# Patient Record
Sex: FEMALE | Race: WHITE | NOT HISPANIC OR LATINO | Employment: OTHER | URBAN - NONMETROPOLITAN AREA
[De-identification: names, ages, dates, MRNs, and addresses within clinical notes are randomized per-mention and may not be internally consistent; named-entity substitution may affect disease eponyms.]

---

## 2017-01-05 ENCOUNTER — HOSPITAL ENCOUNTER (OUTPATIENT)
Dept: GASTROENTEROLOGY | Facility: HOSPITAL | Age: 62
Setting detail: HOSPITAL OUTPATIENT SURGERY
Discharge: HOME | End: 2017-01-05
Attending: INTERNAL MEDICINE | Admitting: INTERNAL MEDICINE

## 2017-01-10 ENCOUNTER — OFFICE VISIT (OUTPATIENT)
Dept: GASTROENTEROLOGY | Facility: CLINIC | Age: 62
End: 2017-01-10

## 2017-01-10 VITALS
HEART RATE: 66 BPM | WEIGHT: 174.1 LBS | BODY MASS INDEX: 27.33 KG/M2 | DIASTOLIC BLOOD PRESSURE: 77 MMHG | SYSTOLIC BLOOD PRESSURE: 143 MMHG | HEIGHT: 67 IN

## 2017-01-10 DIAGNOSIS — K62.5 RECTAL BLEEDING: Primary | ICD-10-CM

## 2017-01-10 DIAGNOSIS — K59.00 CONSTIPATION, UNSPECIFIED CONSTIPATION TYPE: ICD-10-CM

## 2017-01-10 PROCEDURE — 99213 OFFICE O/P EST LOW 20 MIN: CPT | Performed by: NURSE PRACTITIONER

## 2017-01-10 RX ORDER — CETIRIZINE HYDROCHLORIDE 10 MG/1
10 TABLET ORAL DAILY
COMMUNITY

## 2017-01-10 RX ORDER — HYDROCORTISONE ACETATE 25 MG/1
25 SUPPOSITORY RECTAL 2 TIMES DAILY PRN
Qty: 30 SUPPOSITORY | Refills: 0 | Status: SHIPPED | OUTPATIENT
Start: 2017-01-10 | End: 2017-12-04 | Stop reason: SDUPTHER

## 2017-01-10 RX ORDER — FLUTICASONE PROPIONATE 50 MCG
1 SPRAY, SUSPENSION (ML) NASAL DAILY
COMMUNITY
Start: 2015-10-19

## 2017-01-10 RX ORDER — DILTIAZEM HYDROCHLORIDE 180 MG/1
CAPSULE, COATED, EXTENDED RELEASE ORAL
COMMUNITY
Start: 2016-07-15 | End: 2017-01-30

## 2017-01-10 NOTE — PROGRESS NOTES
Chief Complaint   Patient presents with   • Colonoscopy     follow-up       Subjective    Tarah Hawkins is a 61 y.o. female. she is here today as a follow-up.    History of Present Illness  61-year-old female presents to discuss colonoscopy results.  She denies any abdominal pain.  States she has become more constipated recently.  In 2008 patient had a rectal villotubular polyp requiring resection per Dr. Weir.  It was noted to be  villous with no malignancy.  States she is having continual rectal bleeding and rectal discharge that appears mucousy.  She has tried Anusol HC rectal suppositories without relief of symptoms.  States it did help somewhat.  Colonoscopy was completed 1/5/17 noted nonbleeding hemorrhoids.  Hemorrhoids were grade 2. Multiple medium mouth diverticula found sigmoid colon.  There was evidence of prior end to end: Colonic enema anastomosis in the rectosigmoid colon this was patent and characterized by healthy-appearing mucosa anastomosis was traversed.  Exam was otherwise without abnormality and no biopsies were collected.  Plan; recommended Colace daily, Benefiber 1 tablespoon per day for diverticular disease.  Repeat colonoscopy in 5 years for surveillance.  The following portions of the patient's history were reviewed and updated as appropriate:   Past Medical History   Diagnosis Date   • Acute bronchiolitis    • Astigmatism    • Diarrhea    • Diverticular disease of colon    • Ectopic atrial tachycardia    • Ganglion cyst of left foot      DORSAL   • Hemorrhoid    • History of colon polyps    • History of seborrheic keratosis      ON NECK   • History of urinary system disease    • Hyperlipidemia    • Hypertension    • Menopausal syndrome    • Need for vaccination    • Presbyopia    • Pseudomembranous enterocolitis      IMPROVED   • Rapid palpitations    • Shoulder pain    • Skin tag      HISTORY OF   • Supraventricular tachycardia    • Verruca vulgaris      Past Surgical History    Procedure Laterality Date   • Colonoscopy  03/18/2013     Moderately severe diverticulosis found in the sigmoid colon, descending colon, and ascending colon. Stool was aspirated for study. Hemorrhoids found.   • Excision lesion Right 08/26/2002     2 mm punch biopsy, cheek.   • Injection of medication  11/11/2013     KENALOG, X2   • Rectal surgery  10/06/2008     Rectal polyp at 4 cm from the anus posteriorly. Transanal polypectomy.   • Chest wall biopsy  04/08/2002     Chest lesion removal. Shave biopsy, central chest.   • Foot surgery Left 08/22/2000     Removal of ganglion cyst of the dorsal foot.     Family History   Problem Relation Age of Onset   • Hypertension Other    • Stroke Other    • Other Other      COLON PROBLEMS     OB History     No data available        Current Outpatient Prescriptions   Medication Sig Dispense Refill   • cetirizine (zyrTEC) 10 MG tablet Take 10 mg by mouth.     • diltiazem CD (CARDIZEM CD) 180 MG 24 hr capsule Take 180 mg by mouth Daily.     • diltiaZEM CD (CARTIA XT) 180 MG 24 hr capsule      • estradiol (MINIVELLE, VIVELLE-DOT) 0.1 MG/24HR patch Place 1 patch on the skin 2 (Two) Times a Week.     • FIBER PO Take  by mouth. FIBER     • fluticasone (FLONASE) 50 MCG/ACT nasal spray 50 g.     • Multiple Vitamins-Minerals (MULTIVITAMIN PO) Take 1 tablet by mouth Daily.     • pravastatin (PRAVACHOL) 80 MG tablet Take 80 mg by mouth Daily.     • hydrocortisone (ANUSOL-HC) 25 MG suppository Insert 1 suppository into the rectum 2 (Two) Times a Day As Needed for hemorrhoids (rectal discomfort). 30 suppository 0     No current facility-administered medications for this visit.      No Known Allergies  Social History     Social History   • Marital status:      Spouse name: N/A   • Number of children: N/A   • Years of education: N/A     Social History Main Topics   • Smoking status: Never Smoker   • Smokeless tobacco: Never Used   • Alcohol use No   • Drug use: No   • Sexual activity:  "Defer     Other Topics Concern   • Not on file     Social History Narrative       Review of Systems  Review of Systems   HENT: Negative for trouble swallowing.    Gastrointestinal: Positive for anal bleeding (intermittently). Negative for abdominal distention, blood in stool, constipation, diarrhea and rectal pain.        Visit Vitals   • /77   • Pulse 66   • Ht 67\" (170.2 cm)   • Wt 174 lb 1.6 oz (79 kg)   • BMI 27.27 kg/m2       Objective    Physical Exam   Constitutional: She appears well-developed and well-nourished. No distress.   Neck: No thyroid mass and no thyromegaly present.   Cardiovascular: Normal rate, regular rhythm and normal heart sounds.    Pulmonary/Chest: Effort normal and breath sounds normal. No respiratory distress.   Abdominal: Soft. Normal appearance and bowel sounds are normal. She exhibits no distension and no mass. There is no hepatosplenomegaly. There is no tenderness. No hernia.     Hospital Outpatient Visit on 11/14/2016   Component Date Value Ref Range Status   • Free T4 11/14/2016 1.12  0.78 - 2.19 ng/dl Final   • TSH 11/14/2016 0.15* 0.46 - 4.68 uIU/ml Final     Assessment/Plan      1. Rectal bleeding    2. Constipation, unspecified constipation type    .       Review and/or summary of lab tests, radiology, procedures, medications. Review and summary of old records and obtaining of history. The risks and benefits of my recommendations, as well as other treatment options were discussed with the patient today. Questions were answered.    New Medications Ordered This Visit   Medications   • hydrocortisone (ANUSOL-HC) 25 MG suppository     Sig: Insert 1 suppository into the rectum 2 (Two) Times a Day As Needed for hemorrhoids (rectal discomfort).     Dispense:  30 suppository     Refill:  0       Follow-up: As needed.         Results for orders placed or performed during the hospital encounter of 11/14/16   TSH   Result Value Ref Range    TSH 0.15 (L) 0.46 - 4.68 uIU/ml   T4, Free "   Result Value Ref Range    Free T4 1.12 0.78 - 2.19 ng/dl   Results for orders placed or performed during the hospital encounter of 11/14/16   CBC & Differential   Result Value Ref Range    WBC 5.5 3.2 - 9.8 x1000/uL    RBC 4.40 3.77 - 5.16 bill/mm3    Hemoglobin 13.4 12.0 - 15.5 gm/dl    Hematocrit 41.0 35.0 - 45.0 %    MCV 93.2 80.0 - 98.0 fl    MCH 30.5 26.0 - 34.0 pg    MCHC 32.7 31.4 - 36.0 gm/dl    Platelets 307 150 - 450 x1000/mm3    RDW 13.6 11.5 - 14.5 %    MPV 9.6 8.0 - 12.0 fl    Neutrophil Rel % 66.5 37.0 - 80.0 %    Lymphocyte Rel % 20.5 10.0 - 50.0 %    Monocyte Rel % 9.3 0.0 - 12.0 %    Eosinophil Rel % 3.3 0.0 - 7.0 %    Basophil Rel % 0.4 0.0 - 2.0 %    Neutrophils Absolute 3.66 2.00 - 8.60 x1000/uL    Lymphocytes Absolute 1.13 0.60 - 4.20 x1000/uL    Monocytes Absolute 0.51 0.00 - 0.90 x1000/uL    Eosinophils Absolute 0.18 0.00 - 0.70 x1000/uL    Basophils Absolute 0.02 0.00 - 0.20 x1000/uL    nRBC 0     nRBC 0    Lipid Panel   Result Value Ref Range    Total Cholesterol 172 150 - 200 mg/dl    Triglycerides 76 35 - 160 mg/dl    HDL Cholesterol 65.2 35.0 - 100.0 mg/dl    LDL Cholesterol  92 mg/dl   Comprehensive Metabolic Panel   Result Value Ref Range    Glucose 97 70.0 - 100.0 mg/dl    BUN 11 8.0 - 25.0 mg/dl    Creatinine 0.7 0.4 - 1.3 mg/dl    Sodium 139.0 134 - 146 mmol/L    Potassium 4.1 3.4 - 5.4 mmol/L    Chloride 105.0 100.0 - 112.0 mmol/L    CO2 28.0 20.0 - 32.0 mmol/L    Calcium 9.1 8.4 - 10.8 mg/dl    Total Protein 7.2 6.7 - 8.2 gm/dl    Albumin 4.2 3.2 - 5.5 gm/dl    Total Bilirubin 0.8 0.2 - 1.0 mg/dl    Alkaline Phosphatase 66 15 - 121 U/L    ALT (SGPT) 25 10 - 60 U/L    AST (SGOT) 30 10 - 60 U/L    GFR MDRD Non  85 45 - 104 mL/min/1.73 sq.M    GFR MDRD  103 45 - 104 mL/min/1.73 sq.M    Anion Gap 6.0 5.0 - 15.0 mmol/L   Results for orders placed or performed during the hospital encounter of 10/18/16   Urinalysis, Microscopic Only   Result Value Ref  "Range    WBC, UA TNTC (H) NONE SEEN,0-2,3-5  /HPF    RBC, UA 3-5 NONE SEEN,0-2,3-5  /HPF    Epithelial cells 0-2 NONE SEEN,0-2,3-5  /HPF    Bacteria, UA 4+ (H) NONE SEEN   Urinalysis   Result Value Ref Range    Color, UA YELLOW STRAW,YELLOW,DK YELLOW,NAVID    Appearance CLOUDY (H) CLEAR    Specific Gravity, UA 1.020 1.003 - 1.030    pH, UA 6.5 5.0 - 9.0 pH Units    Leukocytes, UA 3+ (H) NEGATIVE    Nitrite, UA POSITIVE (H) NEGATIVE    Protein, UA TRACE (H) NEGATIVE    Glucose, Urine NEGATIVE NEGATIVE mg/dl    Ketones, UA NEGATIVE NEGATIVE    Urobilinogen, UA 0.2 0.2 EU/dl    Blood, UA 1+ (H) NEGATIVE   Results for orders placed or performed in visit on 10/06/16    COLONOSCOPY   Result Value Ref Range     Colonoscopy ORDERED BY DR SHIKHA FLOR    Results for orders placed or performed in visit on 08/11/14   TSH   Result Value Ref Range    TSH 2.11 0.46 - 4.68 uIU/ml   Results for orders placed or performed in visit on 03/18/13   Converted Surgical Pathology   Result Value Ref Range    Spec Descr 1 SPECIMEN(S): A RIGHT COLON BIOPSY     Specimen description 2 SPECIMEN(S): B LEFT COLON BIOPSY     Preoperative Diagnosis   PREOPERATIVE DIAGNOSIS:  None Given       Postoperative Diagnosis   POSTOPERATIVE DIAGNOSIS:  Diarrhea       Gross Description         GROSS DESCRIPTION:  Specimen A is labeled \"#1 right colon\" and consists of 2 tan fragments  measuring 0.6 x 0.4 x 0.2 cm together.  Totally submitted.  Specimen B is labeled \"#2 left colon\" and consists of 3 tan fragments  measuring 1.0 x 0.3 x 0.2 cm together.  Totally submitted.      Final Diagnosis         FINAL DIAGNOSIS:  A.  RIGHT COLON, BIOPSY:            NO SIGNIFICANT HISTOLOGIC ABNORMALITY.  B.  LEFT COLON, BIOPSY:            NO SIGNIFICANT HISTOLOGIC ABNORMALITY.      CONVERTED (HISTORICAL) FINAL PATHOLOGIST       Diagnostician:  MELIDA LEVY M.D.  Pathologist  Electronically Signed 03/19/2013      Diagnosis Code   DIAGNOSIS CODE:  1      Results " "for orders placed or performed in visit on 10/06/08   Converted Surgical Pathology   Result Value Ref Range    Spec Descr 1 SPECIMEN(S): A POLYP, RECTAL     Clinical Information   CLINICAL HISTORY:  None Given       Clinical Diagnosis   CLINICAL DIAGNOSIS:  Rectal polyp       Gross Description         GROSS DESCRIPTION:  The specimen consists of a polypoid structure from the rectum measuring  4.0 cm in greatest dimension.  Bisected and all embedded.      Frozen Diagnosis   FROZEN SECTION DIAGNOSIS:  Villous adenoma.       Final Diagnosis         FINAL DIAGNOSIS:  RECTAL POLYP:       VILLOUS ADENOMA.       NO EVIDENCE OF MALIGNANCY.    DIAGNOSIS CODE:  8      CONVERTED (HISTORICAL) FINAL PATHOLOGIST       Diagnostician:  DENISHA SALTER M.D.  Pathologist  Electronically Signed 10/09/2008     Results for orders placed or performed in visit on 09/11/08   Converted Surgical Pathology   Result Value Ref Range    Spec Descr 1 SPECIMEN(S): A POLYP, RECTAL     Clinical Information   CLINICAL HISTORY:  None Given       Gross Description         GROSS DESCRIPTION:  The container is labeled \"polyp, rectum\" and has multiple nodular  fragments of white soft tissue measuring 1.0 cc in aggregate.  The  entire specimen is embedded.      Final Diagnosis         FINAL DIAGNOSIS:  POLYP, RECTUM:       VILLOTUBULAR ADENOMA.    DIAGNOSIS CODE:  8      Comment   CLINICAL DIAGNOSIS:  Rectal polyp       CONVERTED (HISTORICAL) FINAL PATHOLOGIST       Diagnostician:  DARSHANA CM M.D.  Pathologist  Electronically Signed 09/15/2008       "

## 2017-01-10 NOTE — MR AVS SNAPSHOT
Tarah Hawkins   1/10/2017 11:00 AM   Office Visit    Dept Phone:  [phone]   Encounter #:  [id number]    Provider:  ELY Stein   Department:  [facility] GASTROENTEROLOGY                Your Full Care Plan              Today's Medication Changes          These changes are accurate as of: 1/10/17 11:17 AM.  If you have any questions, ask your nurse or doctor.               New Medication(s)Ordered:     hydrocortisone 25 MG suppository   Commonly known as:  ANUSOL-HC   Insert 1 suppository into the rectum 2 (Two) Times a Day As Needed for hemorrhoids (rectal discomfort).         Stop taking medication(s)listed here:     sodium-potassium-magnesium sulfates solution oral solution   Commonly known as:  SUPREP BOWEL PREP                Where to Get Your Medications      These medications were sent to [facility] [address] [place] [address] AT [facility] [address] - [phone] [facility] [phone] [address] [address]     Phone:  [phone]     hydrocortisone 25 MG suppository                  Your Updated Medication List          This list is accurate as of: 1/10/17 11:17 AM.  Always use your most recent med list.                cetirizine 10 MG tablet   Commonly known as:  zyrTEC       * diltiaZEM  MG 24 hr capsule   Commonly known as:  CARDIZEM CD       * CARTIA  MG 24 hr capsule   Generic drug:  diltiaZEM CD       estradiol 0.1 MG/24HR patch   Commonly known as:  MINIDEJA, LUISELLE-DOT       FIBER PO       fluticasone 50 MCG/ACT nasal spray   Commonly known as:  FLONASE       hydrocortisone 25 MG suppository   Commonly known as:  ANUSOL-HC   Insert 1 suppository into the rectum 2 (Two) Times a Day As Needed for hemorrhoids (rectal discomfort).       MULTIVITAMIN PO       pravastatin 80 MG tablet   Commonly known as:  PRAVACHOL       * Notice:  This list has 2 medication(s) that are the same as other  "medications prescribed for you. Read the directions carefully, and ask your doctor or other care provider to review them with you.            You Were Diagnosed With        Codes Comments    Rectal bleeding    -  Primary ICD-10-CM: K62.5  ICD-9-CM: 569.3     Constipation, unspecified constipation type     ICD-10-CM: K59.00  ICD-9-CM: 564.00       Instructions     None    Patient Instructions History      Upcoming Appointments     Visit Type Date Time Department    OFFICE VISIT 1/10/2017 11:00 AM [facility] [place]  [facility]    OFFICE VISIT 1/30/2017 11:00 AM [facility] [place] [facility]      MyChart Signup     Our records indicate that you have declined [facility] HipLogiqt signup. If you would like to sign up for [employer], please email [email or web address] or call [phone] to obtain an activation code.             Other Info from Your Visit           Your Appointments     Jan 30, 2017 11:00 AM [facility]   Office Visit with Filippo Rousseau MD PhD   [facility] CARDIOLOGY (--)    [address] [place] [address] Dr  [facility] [address] [place] [address] [phone]           Arrive 15 minutes prior to appointment.              Allergies     No Known Allergies      Reason for Visit     Colonoscopy follow-up      Vital Signs     Blood Pressure Pulse Height Weight Body Mass Index Smoking Status    143/77 66 67\" (170.2 cm) 174 lb 1.6 oz (79 kg) 27.27 kg/m2 Never Smoker      Problems and Diagnoses Noted     Rectal bleeding    -  Primary    Constipation            "

## 2017-01-23 RX ORDER — PRAVASTATIN SODIUM 80 MG/1
TABLET ORAL
Qty: 90 TABLET | Refills: 2 | Status: SHIPPED | OUTPATIENT
Start: 2017-01-23 | End: 2017-10-20 | Stop reason: SDUPTHER

## 2017-01-30 ENCOUNTER — OFFICE VISIT (OUTPATIENT)
Dept: CARDIOLOGY | Facility: CLINIC | Age: 62
End: 2017-01-30

## 2017-01-30 VITALS
HEART RATE: 72 BPM | WEIGHT: 175.8 LBS | SYSTOLIC BLOOD PRESSURE: 120 MMHG | HEIGHT: 67 IN | DIASTOLIC BLOOD PRESSURE: 80 MMHG | BODY MASS INDEX: 27.59 KG/M2 | OXYGEN SATURATION: 97 %

## 2017-01-30 DIAGNOSIS — I47.1 ECTOPIC ATRIAL TACHYCARDIA (HCC): Primary | ICD-10-CM

## 2017-01-30 DIAGNOSIS — I34.0 NON-RHEUMATIC MITRAL REGURGITATION: ICD-10-CM

## 2017-01-30 DIAGNOSIS — I10 ESSENTIAL HYPERTENSION: ICD-10-CM

## 2017-01-30 DIAGNOSIS — E78.2 MIXED HYPERLIPIDEMIA: ICD-10-CM

## 2017-01-30 PROCEDURE — 93000 ELECTROCARDIOGRAM COMPLETE: CPT | Performed by: INTERNAL MEDICINE

## 2017-01-30 PROCEDURE — 99214 OFFICE O/P EST MOD 30 MIN: CPT | Performed by: INTERNAL MEDICINE

## 2017-01-30 NOTE — LETTER
January 30, 2017     Joe Jones MD  [address] [place] [address] [address] [place] [address]    Patient: Tarah Hawkins   YOB: 1955   Date of Visit: 1/30/2017       Dear Dr. Karen MD:    Thank you for referring Tarah Hawkins to me for evaluation. Below are the relevant portions of my assessment and plan of care.    If you have questions, please do not hesitate to call me. I look forward to following Tarah along with you.         Sincerely,        Filippo Rousseau MD PhD        CC: No Recipients  Filippo Rousseau MD PhD  1/30/2017 11:14 AM  Sign at close encounter  Cardiovascular Medicine   Filippo Rousseau M.D., Ph.D., [facility]      The patient returns to cardiology clinic for follow-up of the following cardiac problems:    PROBLEM LIST:      1. HTN  A. Preserved EF  B. Mild LVH  2. EAT  A. Dilt  3. HLD    SVT  Patient returns for her EAT.   Very rare episodes of increased cardiac awareness.   No dizziness or syncope.  No emergency department or primary care visits for this issue.  She remains on diltiazem.  Denies side effects.      HTN  Concerning her hypertension, her medication is diltiazem.  She is medication and diet compliant.  Does follow up with her PCP.    HLD  Concerning her hyperlipidemia, she remains on statin.  Denies side effects.  She states she has this monitored by her PCP.        Review of Systems - History obtained from the patient  Cardiovascular ROS: no chest pain or dyspnea on exertion  family history includes Hypertension in her other; Other in her other; Stroke in her other.   reports that she has never smoked. She has never used smokeless tobacco. She reports that she does not drink alcohol or use illicit drugs.  No Known Allergies    Current Outpatient Prescriptions:   •  cetirizine (zyrTEC) 10 MG tablet, Take 10 mg by mouth., Disp: , Rfl:   •  diltiazem CD (CARDIZEM CD) 180 MG 24 hr capsule, Take 180 mg by mouth Daily., Disp: , Rfl:   •  estradiol (MINIVELLE,  VIVELLE-DOT) 0.1 MG/24HR patch, Place 1 patch on the skin 2 (Two) Times a Week., Disp: , Rfl:   •  FIBER PO, Take  by mouth. FIBER, Disp: , Rfl:   •  fluticasone (FLONASE) 50 MCG/ACT nasal spray, 50 g., Disp: , Rfl:   •  hydrocortisone (ANUSOL-HC) 25 MG suppository, Insert 1 suppository into the rectum 2 (Two) Times a Day As Needed for hemorrhoids (rectal discomfort)., Disp: 30 suppository, Rfl: 0  •  Multiple Vitamins-Minerals (MULTIVITAMIN PO), Take 1 tablet by mouth Daily., Disp: , Rfl:   •  pravastatin (PRAVACHOL) 80 MG tablet, TAKE 1 TABLET DAILY, Disp: 90 tablet, Rfl: 2    Physical Exam:  There were no vitals filed for this visit.  There is no height or weight on file to calculate BMI.    GEN: alert, appears stated age and cooperative  Body Habitus: well-nourished  Neuro: CN II-XII grossly intact.   HEENT: Head: Normocephalic, no lesions, without obvious abnormality.  Neck / Thyroid: Supple, no masses, nodes, nodules or enlargement. No arcus senilis, xanthelasma or xanthomas. PERRL. Normal external ears. No drainage. No thyromegaly. Neck supple. No LAD. Trachea midline. Nose, normal.  JVP: 6 cm of water at 45 degrees HJR: absent      Carotid:  Upstroke: easily palpated bilaterally Volume: Normal.    Carotid Bruit:  None  Subclavian Bruit: Not present.    Lymph: No overt LAD.   Back: Normal.  Chest:  Normal Excursion: Good    I:E: Good  Pulmonary:clear to auscultation, no wheezes, rales or rhonchi, symmetric air entry. Equal chest excursion. Chest physical exam is normal. No tenderness.        Precordium:  No palpable heaves or thrusts. P2 is not palpable.   [place]:  normal size and placement Palpable S4: Not present.   Heart rate: normal  Heart Rhythm: regular     Heart Sounds: S1: normal intensity  S2: normal intensity  S3: absent   S4: absent  Opening Snap: absent  A2-OS:  N/A  Pericardial rub: absent    Ejection click: None      Murmurs: Systolic: none  Diastolic: none  Extremity: no edema, cyanosis  Pulses:  "Left radial artery has 2+ (normal) pulse    DATA REVIEWED:     EKG: Dated 30-JAN-2017. Interpreted today. No prior. NSR with Sinus arr.    Date of last echocardiogram 08/12/2014, Echocardiogram finding Preserved EF  Mild LVH  Holter: EAT    Assessment/Plan      1. EAT. Well controlled.   Continue diltiazem and PCP follow-up    2. HTN, essential.  HD {htn exam:885614::\"BP noted to be well controlled today in office\",\"S1, S2 normal, no gallop, no murmur, chest clear, no JVD, no HSM, no edema\"}.    DASH; medication compliance; Low sodium diet  {plan; hypertension:39683}     3. Dyslipidemia.  Under good control.  -Statin:  Yes.  -Recommended moderate-intensity statin therapy  -Lipid-lowering medications: Statin     4. Tobacco status: non-smoker     Plan for follow-up: PCP    Thank you for allowing me  to participate in the care of your patient. Please do not hesitate to contact me with any questions.     Filippo Rousseau M.D., Ph.D., [facility]      EMR Dragon/Transcription disclaimer:     Much of this encounter note is an electronic transcription/translation of spoken language to printed text. The electronic translation of spoken language may permit erroneous, or at times, nonsensical words or phrases to be inadvertently transcribed; Although I have reviewed the note for such errors, some may still exist.     "

## 2017-01-30 NOTE — PROGRESS NOTES
Cardiovascular Medicine   Filippo Rousseau M.D., Ph.D., [facility]      The patient returns to cardiology clinic for follow-up of the following cardiac problems:    PROBLEM LIST:      1. HTN  A. Preserved EF  B. Mild LVH  2. EAT  A. Dilt  3. HLD    SVT  Patient returns for her EAT.   Very rare episodes of increased cardiac awareness.   No dizziness or syncope.  No emergency department or primary care visits for this issue.  She remains on diltiazem.  Denies side effects.      HTN  Concerning her hypertension, her medication is diltiazem.  She is medication and diet compliant.  Does follow up with her PCP.    HLD  Concerning her hyperlipidemia, she remains on statin.  Denies side effects.  She states she has this monitored by her PCP.          Review of Systems - History obtained from the patient  Cardiovascular ROS: no chest pain or dyspnea on exertion  family history includes Hypertension in her other; Other in her other; Stroke in her other.   reports that she has never smoked. She has never used smokeless tobacco. She reports that she does not drink alcohol or use illicit drugs.  No Known Allergies    Current Outpatient Prescriptions:   •  cetirizine (zyrTEC) 10 MG tablet, Take 10 mg by mouth., Disp: , Rfl:   •  diltiazem CD (CARDIZEM CD) 180 MG 24 hr capsule, Take 180 mg by mouth Daily., Disp: , Rfl:   •  estradiol (MINIVELLE, VIVELLE-DOT) 0.1 MG/24HR patch, Place 1 patch on the skin 2 (Two) Times a Week., Disp: , Rfl:   •  FIBER PO, Take  by mouth. FIBER, Disp: , Rfl:   •  fluticasone (FLONASE) 50 MCG/ACT nasal spray, 50 g., Disp: , Rfl:   •  hydrocortisone (ANUSOL-HC) 25 MG suppository, Insert 1 suppository into the rectum 2 (Two) Times a Day As Needed for hemorrhoids (rectal discomfort)., Disp: 30 suppository, Rfl: 0  •  Multiple Vitamins-Minerals (MULTIVITAMIN PO), Take 1 tablet by mouth Daily., Disp: , Rfl:   •  pravastatin (PRAVACHOL) 80 MG tablet, TAKE 1 TABLET DAILY, Disp: 90 tablet, Rfl: 2    Physical  Exam:  Vitals:    01/30/17 1104   BP: 120/80   Pulse: 72   SpO2: 97%     Body mass index is 27.53 kg/(m^2).    GEN: alert, appears stated age and cooperative  Body Habitus: well-nourished  Neuro: CN II-XII grossly intact.   HEENT: Head: Normocephalic, no lesions, without obvious abnormality.  Neck / Thyroid: Supple, no masses, nodes, nodules or enlargement. No arcus senilis, xanthelasma or xanthomas. PERRL. Normal external ears. No drainage. No thyromegaly. Neck supple. No LAD. Trachea midline. Nose, normal.  JVP: 6 cm of water at 45 degrees HJR: absent      Carotid:  Upstroke: easily palpated bilaterally Volume: Normal.    Carotid Bruit:  None  Subclavian Bruit: Not present.    Lymph: No overt LAD.   Back: Normal.  Chest:  Normal Excursion: Good    I:E: Good  Pulmonary:clear to auscultation, no wheezes, rales or rhonchi, symmetric air entry. Equal chest excursion. Chest physical exam is normal. No tenderness.        Precordium:  No palpable heaves or thrusts. P2 is not palpable.   [place]:  normal size and placement Palpable S4: Not present.   Heart rate: normal  Heart Rhythm: regular     Heart Sounds: S1: normal intensity  S2: normal intensity  S3: absent   S4: absent  Opening Snap: absent  A2-OS:  N/A  Pericardial rub: absent    Ejection click: None      Murmurs: Systolic: none  Diastolic: none  Extremity: no edema, cyanosis  Pulses: Left radial artery has 2+ (normal) pulse    DATA REVIEWED:     EKG: Dated 30-JAN-2017. Interpreted today. No prior. NSR with Sinus arr.    Date of last echocardiogram 08/12/2014, Echocardiogram finding Preserved EF  Mild LVH  Holter: EAT    Assessment/Plan      1. EAT. Well controlled.   Continue diltiazem and PCP follow-up    2. HTN, essential.  HD BP noted to be well controlled today in office.    DASH; medication compliance; Low sodium diet  Continue current treatment regimen.     3. Dyslipidemia.  Under good control.  -Statin:  Yes.  -Recommended moderate-intensity statin  therapy  -Lipid-lowering medications: Statin     4. MR, trace to possibly mild.   TTE    5. Tobacco status: non-smoker     Plan for follow-up: One month; then PCP assuming no valvular abnormality    Thank you for allowing me  to participate in the care of your patient. Please do not hesitate to contact me with any questions.     Filippo Rousseau M.D., Ph.D., [facility]      EMR Dragon/Transcription disclaimer:     Much of this encounter note is an electronic transcription/translation of spoken language to printed text. The electronic translation of spoken language may permit erroneous, or at times, nonsensical words or phrases to be inadvertently transcribed; Although I have reviewed the note for such errors, some may still exist.

## 2017-01-30 NOTE — MR AVS SNAPSHOT
Tarah Hawkins   1/30/2017 11:00 AM   Office Visit    Dept Phone:  [phone]   Encounter #:  [id number]    Provider:  Filippo Rousseau MD PhD   Department:  [facility] CARDIOLOGY                Your Full Care Plan              Today's Medication Changes          These changes are accurate as of: 1/30/17 11:25 AM.  If you have any questions, ask your nurse or doctor.               Medication(s)that have changed:     diltiaZEM  MG 24 hr capsule   Commonly known as:  CARDIZEM CD   Take 180 mg by mouth Daily.   What changed:  Another medication with the same name was removed. Continue taking this medication, and follow the directions you see here.   Changed by:  Lalito Segura                  Your Updated Medication List          This list is accurate as of: 1/30/17 11:25 AM.  Always use your most recent med list.                cetirizine 10 MG tablet   Commonly known as:  zyrTEC       diltiaZEM  MG 24 hr capsule   Commonly known as:  CARDIZEM CD       estradiol 0.1 MG/24HR patch   Commonly known as:  MINIVELCOURTNEY, LUISELLE-DOT       FIBER PO       fluticasone 50 MCG/ACT nasal spray   Commonly known as:  FLONASE       hydrocortisone 25 MG suppository   Commonly known as:  ANUSOL-HC   Insert 1 suppository into the rectum 2 (Two) Times a Day As Needed for hemorrhoids (rectal discomfort).       MULTIVITAMIN PO       pravastatin 80 MG tablet   Commonly known as:  PRAVACHOL   TAKE 1 TABLET DAILY               We Performed the Following     Adult Transthoracic Echo Complete       You Were Diagnosed With        Codes Comments    Ectopic atrial tachycardia    -  Primary ICD-10-CM: I47.1  ICD-9-CM: 427.89     Essential hypertension     ICD-10-CM: I10  ICD-9-CM: 401.9     Mixed hyperlipidemia     ICD-10-CM: E78.2  ICD-9-CM: 272.2     Non-rheumatic mitral regurgitation     ICD-10-CM: I34.0  ICD-9-CM: 424.0       Instructions     None    Patient Instructions History       Upcoming Appointments     Visit Type Date Time Department    OFFICE VISIT 2017 11:00 AM [facility] CARDIOLOGY [facility] ECHO 2D COMPLETE [place] 2017  1:00 PM [facility] CARDIOLOGY [facility]    OFFICE VISIT 3/6/2017  3:00 PM [facility]     [facility] "[employer] allows you to send messages to your doctor, view your test results, renew your prescriptions, schedule appointments, and more. To sign up, go to [employer] and click on the Sign Up Now link in the New User? box. Enter your "[employer] Activation Code exactly as it appears below along with the last four digits of your Social Security Number and your Date of Birth () to complete the sign-up process. If you do not sign up before the expiration date, you must request a new code.    "[employer] Activation Code: [email or web address]  Expires: 2017 11:23 AM    If you have questions, you can email [email or web address] or call [phone] to talk to our "[employer] staff. Remember, "[employer] is NOT to be used for urgent needs. For medical emergencies, dial 911.               Other Info from Your Visit           Your Appointments     2017  1:00 PM CST   ECHOCARDIOGRAM 2D COMPLETE VISIT with [facility] ECHO ROOM   [facility] CARDIOLOGY (--)    [address] [place] [address] Dr  [facility] [address] [place] [id number] [phone]           Bring your insurance cards with you. Wear comfortable clothing and shoes.            Mar 06, 2017  3:00 PM CST   Office Visit with Filippo Rousseau MD PhD   [facility] CARDIOLOGY (--)    [address] [place] [address] Dr  [facility] [address] [place] [id number] [phone]           Arrive 15 minutes prior to appointment.              Allergies     No Known Allergies      Reason for Visit     ectopic atrial tachycardia           Vital Signs     Blood Pressure Pulse Height Weight Oxygen Saturation Body Mass Index    120/80 (BP Location: Left arm, Patient  "Position: Sitting, Cuff Size: Adult) 72 67\" (170.2 cm) 175 lb 12.8 oz (79.7 kg) 97% 27.53 kg/m2    Smoking Status                   Never Smoker           Problems and Diagnoses Noted     Ectopic atrial tachycardia    -  Primary    High blood pressure        Mixed hyperlipidemia        Non-rheumatic mitral regurgitation            "

## 2017-01-30 NOTE — LETTER
January 30, 2017     Joe Jones MD  [address] [place] [address] [address] [place] [address]    Patient: Tarah Hawkins   YOB: 1955   Date of Visit: 1/30/2017       Dear Dr. Karen MD:    Thank you for referring Tarah Hawkins to me for evaluation. Below are the relevant portions of my assessment and plan of care.    If you have questions, please do not hesitate to call me. I look forward to following Tarah along with you.         Sincerely,        Filippo Rousseau MD PhD        CC: No Recipients  Filippo Rousseau MD PhD  1/30/2017 11:14 AM  Sign at close encounter  Cardiovascular Medicine   Filippo Rousseau M.D., Ph.D., [facility]      The patient returns to cardiology clinic for follow-up of the following cardiac problems:    PROBLEM LIST:      1. HTN  A. Preserved EF  B. Mild LVH  2. EAT  A. Dilt  3. HLD    SVT  Patient returns for her EAT.   Very rare episodes of increased cardiac awareness.   No dizziness or syncope.  No emergency department or primary care visits for this issue.  She remains on diltiazem.  Denies side effects.      HTN  Concerning her hypertension, her medication is diltiazem.  She is medication and diet compliant.  Does follow up with her PCP.    HLD  Concerning her hyperlipidemia, she remains on statin.  Denies side effects.  She states she has this monitored by her PCP.        Review of Systems - History obtained from the patient  Cardiovascular ROS: no chest pain or dyspnea on exertion  family history includes Hypertension in her other; Other in her other; Stroke in her other.   reports that she has never smoked. She has never used smokeless tobacco. She reports that she does not drink alcohol or use illicit drugs.  No Known Allergies    Current Outpatient Prescriptions:   •  cetirizine (zyrTEC) 10 MG tablet, Take 10 mg by mouth., Disp: , Rfl:   •  diltiazem CD (CARDIZEM CD) 180 MG 24 hr capsule, Take 180 mg by mouth Daily., Disp: , Rfl:   •  estradiol (MINIVELLE,  VIVELLE-DOT) 0.1 MG/24HR patch, Place 1 patch on the skin 2 (Two) Times a Week., Disp: , Rfl:   •  FIBER PO, Take  by mouth. FIBER, Disp: , Rfl:   •  fluticasone (FLONASE) 50 MCG/ACT nasal spray, 50 g., Disp: , Rfl:   •  hydrocortisone (ANUSOL-HC) 25 MG suppository, Insert 1 suppository into the rectum 2 (Two) Times a Day As Needed for hemorrhoids (rectal discomfort)., Disp: 30 suppository, Rfl: 0  •  Multiple Vitamins-Minerals (MULTIVITAMIN PO), Take 1 tablet by mouth Daily., Disp: , Rfl:   •  pravastatin (PRAVACHOL) 80 MG tablet, TAKE 1 TABLET DAILY, Disp: 90 tablet, Rfl: 2    Physical Exam:  There were no vitals filed for this visit.  There is no height or weight on file to calculate BMI.    GEN: alert, appears stated age and cooperative  Body Habitus: well-nourished  Neuro: CN II-XII grossly intact.   HEENT: Head: Normocephalic, no lesions, without obvious abnormality.  Neck / Thyroid: Supple, no masses, nodes, nodules or enlargement. No arcus senilis, xanthelasma or xanthomas. PERRL. Normal external ears. No drainage. No thyromegaly. Neck supple. No LAD. Trachea midline. Nose, normal.  JVP: 6 cm of water at 45 degrees HJR: absent      Carotid:  Upstroke: easily palpated bilaterally Volume: Normal.    Carotid Bruit:  None  Subclavian Bruit: Not present.    Lymph: No overt LAD.   Back: Normal.  Chest:  Normal Excursion: Good    I:E: Good  Pulmonary:clear to auscultation, no wheezes, rales or rhonchi, symmetric air entry. Equal chest excursion. Chest physical exam is normal. No tenderness.        Precordium:  No palpable heaves or thrusts. P2 is not palpable.   [place]:  normal size and placement Palpable S4: Not present.   Heart rate: normal  Heart Rhythm: regular     Heart Sounds: S1: normal intensity  S2: normal intensity  S3: absent   S4: absent  Opening Snap: absent  A2-OS:  N/A  Pericardial rub: absent    Ejection click: None      Murmurs: Systolic: none  Diastolic: none  Extremity: no edema, cyanosis  Pulses:  "Left radial artery has 2+ (normal) pulse    DATA REVIEWED:     EKG: Dated 30-JAN-2017. Interpreted today. No prior. NSR with Sinus arr.    Date of last echocardiogram 08/12/2014, Echocardiogram finding Preserved EF  Mild LVH  Holter: EAT    Assessment/Plan      1. EAT. Well controlled.   Continue diltiazem and PCP follow-up    2. HTN, essential.  HD {htn exam:153109::\"BP noted to be well controlled today in office\",\"S1, S2 normal, no gallop, no murmur, chest clear, no JVD, no HSM, no edema\"}.    DASH; medication compliance; Low sodium diet  {plan; hypertension:80208}     3. Dyslipidemia.  Under good control.  -Statin:  Yes.  -Recommended moderate-intensity statin therapy  -Lipid-lowering medications: Statin     4. Tobacco status: non-smoker     Plan for follow-up: PCP    Thank you for allowing me  to participate in the care of your patient. Please do not hesitate to contact me with any questions.     Filippo Rousseau M.D., Ph.D., [facility]      EMR Dragon/Transcription disclaimer:     Much of this encounter note is an electronic transcription/translation of spoken language to printed text. The electronic translation of spoken language may permit erroneous, or at times, nonsensical words or phrases to be inadvertently transcribed; Although I have reviewed the note for such errors, some may still exist.     "

## 2017-01-31 RX ORDER — DILTIAZEM HYDROCHLORIDE 180 MG/1
180 CAPSULE, COATED, EXTENDED RELEASE ORAL DAILY
Qty: 90 CAPSULE | Refills: 1 | Status: SHIPPED | OUTPATIENT
Start: 2017-01-31 | End: 2017-05-22 | Stop reason: SDUPTHER

## 2017-01-31 RX ORDER — DILTIAZEM HYDROCHLORIDE 180 MG/1
180 CAPSULE, COATED, EXTENDED RELEASE ORAL DAILY
Qty: 14 CAPSULE | Refills: 0 | Status: SHIPPED | OUTPATIENT
Start: 2017-01-31 | End: 2017-01-31 | Stop reason: SDUPTHER

## 2017-02-15 LAB
BH CV ECHO MEAS - ACS: 2.1 CM
BH CV ECHO MEAS - AO ISTHMUS: 3.4 CM
BH CV ECHO MEAS - AO MAX PG (FULL): 2.1 MMHG
BH CV ECHO MEAS - AO MAX PG: 7.3 MMHG
BH CV ECHO MEAS - AO MEAN PG (FULL): 1 MMHG
BH CV ECHO MEAS - AO MEAN PG: 4 MMHG
BH CV ECHO MEAS - AO ROOT AREA: 12.6 CM^2
BH CV ECHO MEAS - AO ROOT DIAM: 4 CM
BH CV ECHO MEAS - AO V2 MAX: 135 CM/SEC
BH CV ECHO MEAS - AO V2 MEAN: 94.5 CM/SEC
BH CV ECHO MEAS - AO V2 VTI: 24.2 CM
BH CV ECHO MEAS - ASC AORTA: 3.6 CM
BH CV ECHO MEAS - AVA(I,A): 3.6 CM^2
BH CV ECHO MEAS - AVA(I,D): 3.6 CM^2
BH CV ECHO MEAS - AVA(V,A): 2.9 CM^2
BH CV ECHO MEAS - AVA(V,D): 2.9 CM^2
BH CV ECHO MEAS - EDV(CUBED): 110.6 ML
BH CV ECHO MEAS - EDV(TEICH): 107.5 ML
BH CV ECHO MEAS - EF(CUBED): 70.4 %
BH CV ECHO MEAS - EF(TEICH): 61.9 %
BH CV ECHO MEAS - ESV(CUBED): 32.8 ML
BH CV ECHO MEAS - ESV(TEICH): 41 ML
BH CV ECHO MEAS - FS: 33.3 %
BH CV ECHO MEAS - IVS/LVPW: 1
BH CV ECHO MEAS - IVSD: 1 CM
BH CV ECHO MEAS - LA DIMENSION: 3.3 CM
BH CV ECHO MEAS - LA/AO: 0.83
BH CV ECHO MEAS - LV MASS(C)D: 170.2 GRAMS
BH CV ECHO MEAS - LV MAX PG: 5.2 MMHG
BH CV ECHO MEAS - LV MEAN PG: 3 MMHG
BH CV ECHO MEAS - LV V1 MAX: 114 CM/SEC
BH CV ECHO MEAS - LV V1 MEAN: 73.9 CM/SEC
BH CV ECHO MEAS - LV V1 VTI: 25.3 CM
BH CV ECHO MEAS - LVIDD: 4.8 CM
BH CV ECHO MEAS - LVIDS: 3.2 CM
BH CV ECHO MEAS - LVOT AREA (M): 3.5 CM^2
BH CV ECHO MEAS - LVOT AREA: 3.5 CM^2
BH CV ECHO MEAS - LVOT DIAM: 2.1 CM
BH CV ECHO MEAS - LVPWD: 1 CM
BH CV ECHO MEAS - MR MAX PG: 101.6 MMHG
BH CV ECHO MEAS - MR MAX VEL: 504 CM/SEC
BH CV ECHO MEAS - MV A MAX VEL: 71 CM/SEC
BH CV ECHO MEAS - MV DEC SLOPE: 536 CM/SEC^2
BH CV ECHO MEAS - MV E MAX VEL: 71.7 CM/SEC
BH CV ECHO MEAS - MV E/A: 1
BH CV ECHO MEAS - MV MAX PG: 3.3 MMHG
BH CV ECHO MEAS - MV MEAN PG: 1 MMHG
BH CV ECHO MEAS - MV P1/2T MAX VEL: 86.4 CM/SEC
BH CV ECHO MEAS - MV P1/2T: 47.2 MSEC
BH CV ECHO MEAS - MV V2 MAX: 90.4 CM/SEC
BH CV ECHO MEAS - MV V2 MEAN: 54.2 CM/SEC
BH CV ECHO MEAS - MV V2 VTI: 22.6 CM
BH CV ECHO MEAS - MVA P1/2T LCG: 2.5 CM^2
BH CV ECHO MEAS - MVA(P1/2T): 4.7 CM^2
BH CV ECHO MEAS - MVA(VTI): 3.9 CM^2
BH CV ECHO MEAS - PA MAX PG: 3.1 MMHG
BH CV ECHO MEAS - PA V2 MAX: 87.6 CM/SEC
BH CV ECHO MEAS - RVDD: 2.5 CM
BH CV ECHO MEAS - SV(AO): 304.1 ML
BH CV ECHO MEAS - SV(CUBED): 77.8 ML
BH CV ECHO MEAS - SV(LVOT): 87.6 ML
BH CV ECHO MEAS - SV(TEICH): 66.6 ML
BH CV ECHO MEAS - TR MAX VEL: 251 CM/SEC

## 2017-03-06 ENCOUNTER — OFFICE VISIT (OUTPATIENT)
Dept: CARDIOLOGY | Facility: CLINIC | Age: 62
End: 2017-03-06

## 2017-03-06 VITALS
OXYGEN SATURATION: 97 % | WEIGHT: 175 LBS | DIASTOLIC BLOOD PRESSURE: 68 MMHG | SYSTOLIC BLOOD PRESSURE: 118 MMHG | HEIGHT: 67 IN | BODY MASS INDEX: 27.47 KG/M2 | HEART RATE: 79 BPM

## 2017-03-06 DIAGNOSIS — I34.0 NON-RHEUMATIC MITRAL REGURGITATION: ICD-10-CM

## 2017-03-06 DIAGNOSIS — E78.2 MIXED HYPERLIPIDEMIA: ICD-10-CM

## 2017-03-06 DIAGNOSIS — I47.1 SVT (SUPRAVENTRICULAR TACHYCARDIA) (HCC): Primary | ICD-10-CM

## 2017-03-06 DIAGNOSIS — I10 ESSENTIAL HYPERTENSION: ICD-10-CM

## 2017-03-06 PROCEDURE — 99214 OFFICE O/P EST MOD 30 MIN: CPT | Performed by: INTERNAL MEDICINE

## 2017-03-06 NOTE — PROGRESS NOTES
Cardiovascular Medicine   Filippo Rousseau M.D., Ph.D., [facility]      The patient returns to cardiology clinic for follow-up of the following cardiac problems:    PROBLEM LIST:      1. HTN  A. Preserved EF  B. Mild LVH  2. EAT  A. Dilt  3. HLD  4. Mild MR    SVT  Patient returns for her EAT.   Very rare episodes of increased cardiac awareness.   No dizziness or syncope.   She remains on diltiazem.  Denies side effects.      HTN  Concerning her hypertension, her medication is diltiazem.  She is medication and diet compliant.  Does follow up with her PCP.    HLD  Concerning her hyperlipidemia, she remains on statin.  Denies side effects.  She states she has this monitored by her PCP.    Mitral Valve Regurgitation  Echo 2017 showed mild MR. She remains asymptomatic.       Review of Systems - History obtained from the patient  Cardiovascular ROS: no chest pain or dyspnea on exertion  family history includes Hypertension in her other; Other in her other; Stroke in her other.   reports that she has never smoked. She has never used smokeless tobacco. She reports that she does not drink alcohol or use illicit drugs.  No Known Allergies    Current Outpatient Prescriptions:   •  cetirizine (zyrTEC) 10 MG tablet, Take 10 mg by mouth., Disp: , Rfl:   •  diltiaZEM CD (CARDIZEM CD) 180 MG 24 hr capsule, Take 1 capsule by mouth Daily., Disp: 90 capsule, Rfl: 1  •  estradiol (MINIVELLE, VIVELLE-DOT) 0.1 MG/24HR patch, Place 1 patch on the skin 2 (Two) Times a Week., Disp: , Rfl:   •  FIBER PO, Take  by mouth. FIBER, Disp: , Rfl:   •  fluticasone (FLONASE) 50 MCG/ACT nasal spray, 50 g., Disp: , Rfl:   •  hydrocortisone (ANUSOL-HC) 25 MG suppository, Insert 1 suppository into the rectum 2 (Two) Times a Day As Needed for hemorrhoids (rectal discomfort)., Disp: 30 suppository, Rfl: 0  •  Multiple Vitamins-Minerals (MULTIVITAMIN PO), Take 1 tablet by mouth Daily., Disp: , Rfl:   •  pravastatin (PRAVACHOL) 80 MG tablet, TAKE 1 TABLET  DAILY, Disp: 90 tablet, Rfl: 2    Physical Exam:  Vitals:    03/06/17 1424   BP: 118/68   Pulse: 79   SpO2: 97%     Body mass index is 27.41 kg/(m^2).    GEN: alert, appears stated age and cooperative  Body Habitus: well-nourished  Neuro: CN II-XII grossly intact.   HEENT: Head: Normocephalic, no lesions, without obvious abnormality.  Neck / Thyroid: Supple, no masses, nodes, nodules or enlargement. No arcus senilis, xanthelasma or xanthomas. PERRL. Normal external ears. No drainage. No thyromegaly. Neck supple. No LAD. Trachea midline. Nose, normal.  JVP: 6 cm of water at 45 degrees HJR: absent      Carotid:  Upstroke: easily palpated bilaterally Volume: Normal.    Carotid Bruit:  None  Subclavian Bruit: Not present.    Lymph: No overt LAD.   Back: Normal.  Chest:  Normal Excursion: Good    I:E: Good  Pulmonary:clear to auscultation, no wheezes, rales or rhonchi, symmetric air entry. Equal chest excursion. Chest physical exam is normal. No tenderness.        Precordium:  No palpable heaves or thrusts. P2 is not palpable.   [place]:  normal size and placement Palpable S4: Not present.   Heart rate: normal  Heart Rhythm: regular     Heart Sounds: S1: normal intensity  S2: normal intensity  S3: absent   S4: absent  Opening Snap: absent  A2-OS:  N/A  Pericardial rub: absent    Ejection click: None      Murmurs: Systolic: none  Diastolic: none  Extremity: no edema, cyanosis  Pulses: Left radial artery has 2+ (normal) pulse    DATA REVIEWED:     EKG: Dated 30-JAN-2017. Interpreted today. No prior. NSR with Sinus arr.    Date of last echocardiogram 08/12/2014, Echocardiogram finding Preserved EF  Mild LVH  Holter: EAT    Assessment/Plan      1. EAT. Well controlled.   Continue diltiazem and PCP follow-up    2. HTN, essential.  HD BP noted to be well controlled today in office.    DASH; medication compliance; Low sodium diet  Continue current treatment regimen.     3. Dyslipidemia.  Under good control.  -Statin:   Yes.  -Recommended moderate-intensity statin therapy  -Lipid-lowering medications: Statin     4. MR, Mild.   Yearly follow-up    5. Tobacco status: non-smoker     Plan for follow-up: One year    Thank you for allowing me  to participate in the care of your patient. Please do not hesitate to contact me with any questions.     Filippo Rousseau M.D., Ph.D., [facility]      EMR Dragon/Transcription disclaimer:     Much of this encounter note is an electronic transcription/translation of spoken language to printed text. The electronic translation of spoken language may permit erroneous, or at times, nonsensical words or phrases to be inadvertently transcribed; Although I have reviewed the note for such errors, some may still exist.

## 2017-05-22 ENCOUNTER — TELEPHONE (OUTPATIENT)
Dept: ADMINISTRATIVE | Facility: HOSPITAL | Age: 62
End: 2017-05-22

## 2017-05-22 RX ORDER — DILTIAZEM HYDROCHLORIDE 180 MG/1
180 CAPSULE, COATED, EXTENDED RELEASE ORAL DAILY
Qty: 30 CAPSULE | Refills: 1 | Status: SHIPPED | OUTPATIENT
Start: 2017-05-22 | End: 2018-03-08 | Stop reason: SDUPTHER

## 2017-09-22 RX ORDER — DILTIAZEM HYDROCHLORIDE 180 MG/1
CAPSULE, EXTENDED RELEASE ORAL
Qty: 90 CAPSULE | Refills: 1 | Status: SHIPPED | OUTPATIENT
Start: 2017-09-22 | End: 2018-02-13 | Stop reason: SDUPTHER

## 2017-10-20 RX ORDER — PRAVASTATIN SODIUM 80 MG/1
80 TABLET ORAL DAILY
Qty: 30 TABLET | Refills: 0 | Status: SHIPPED | OUTPATIENT
Start: 2017-10-20 | End: 2017-11-16 | Stop reason: SDUPTHER

## 2017-11-17 RX ORDER — PRAVASTATIN SODIUM 80 MG/1
80 TABLET ORAL DAILY
Qty: 14 TABLET | Refills: 0 | Status: SHIPPED | OUTPATIENT
Start: 2017-11-17 | End: 2017-12-04 | Stop reason: SDUPTHER

## 2017-11-21 DIAGNOSIS — I10 ESSENTIAL HYPERTENSION: Primary | ICD-10-CM

## 2017-11-30 ENCOUNTER — LAB (OUTPATIENT)
Dept: LAB | Facility: HOSPITAL | Age: 62
End: 2017-11-30

## 2017-11-30 DIAGNOSIS — I10 ESSENTIAL HYPERTENSION: ICD-10-CM

## 2017-11-30 LAB
ALBUMIN SERPL-MCNC: 4.4 G/DL (ref 3.4–4.8)
ALBUMIN/GLOB SERPL: 1.5 G/DL (ref 1.1–1.8)
ALP SERPL-CCNC: 60 U/L (ref 38–126)
ALT SERPL W P-5'-P-CCNC: 36 U/L (ref 9–52)
ANION GAP SERPL CALCULATED.3IONS-SCNC: 10 MMOL/L (ref 5–15)
ARTICHOKE IGE QN: 92 MG/DL (ref 1–129)
AST SERPL-CCNC: 41 U/L (ref 14–36)
BASOPHILS # BLD AUTO: 0.02 10*3/MM3 (ref 0–0.2)
BASOPHILS NFR BLD AUTO: 0.3 % (ref 0–2)
BILIRUB SERPL-MCNC: 0.6 MG/DL (ref 0.2–1.3)
BUN BLD-MCNC: 13 MG/DL (ref 7–21)
BUN/CREAT SERPL: 17.3 (ref 7–25)
CALCIUM SPEC-SCNC: 9.5 MG/DL (ref 8.4–10.2)
CHLORIDE SERPL-SCNC: 103 MMOL/L (ref 95–110)
CHOLEST SERPL-MCNC: 194 MG/DL (ref 0–199)
CO2 SERPL-SCNC: 28 MMOL/L (ref 22–31)
CREAT BLD-MCNC: 0.75 MG/DL (ref 0.5–1)
DEPRECATED RDW RBC AUTO: 44.9 FL (ref 36.4–46.3)
EOSINOPHIL # BLD AUTO: 0.29 10*3/MM3 (ref 0–0.7)
EOSINOPHIL NFR BLD AUTO: 4.7 % (ref 0–7)
ERYTHROCYTE [DISTWIDTH] IN BLOOD BY AUTOMATED COUNT: 13.3 % (ref 11.5–14.5)
GFR SERPL CREATININE-BSD FRML MDRD: 78 ML/MIN/1.73 (ref 45–104)
GLOBULIN UR ELPH-MCNC: 3 GM/DL (ref 2.3–3.5)
GLUCOSE BLD-MCNC: 93 MG/DL (ref 60–100)
HCT VFR BLD AUTO: 43.2 % (ref 35–45)
HDLC SERPL-MCNC: 65 MG/DL (ref 60–200)
HGB BLD-MCNC: 14.1 G/DL (ref 12–15.5)
IMM GRANULOCYTES # BLD: 0.01 10*3/MM3 (ref 0–0.02)
IMM GRANULOCYTES NFR BLD: 0.2 % (ref 0–0.5)
LDLC/HDLC SERPL: 1.64 {RATIO} (ref 0–3.22)
LYMPHOCYTES # BLD AUTO: 1.23 10*3/MM3 (ref 0.6–4.2)
LYMPHOCYTES NFR BLD AUTO: 20.1 % (ref 10–50)
MCH RBC QN AUTO: 30.3 PG (ref 26.5–34)
MCHC RBC AUTO-ENTMCNC: 32.6 G/DL (ref 31.4–36)
MCV RBC AUTO: 92.7 FL (ref 80–98)
MONOCYTES # BLD AUTO: 0.46 10*3/MM3 (ref 0–0.9)
MONOCYTES NFR BLD AUTO: 7.5 % (ref 0–12)
NEUTROPHILS # BLD AUTO: 4.12 10*3/MM3 (ref 2–8.6)
NEUTROPHILS NFR BLD AUTO: 67.2 % (ref 37–80)
PLATELET # BLD AUTO: 314 10*3/MM3 (ref 150–450)
PMV BLD AUTO: 10.1 FL (ref 8–12)
POTASSIUM BLD-SCNC: 4.3 MMOL/L (ref 3.5–5.1)
PROT SERPL-MCNC: 7.4 G/DL (ref 6.3–8.6)
RBC # BLD AUTO: 4.66 10*6/MM3 (ref 3.77–5.16)
SODIUM BLD-SCNC: 141 MMOL/L (ref 137–145)
T4 SERPL-MCNC: 8.51 MCG/DL (ref 5.5–11)
TRIGL SERPL-MCNC: 111 MG/DL (ref 20–199)
TSH SERPL DL<=0.05 MIU/L-ACNC: 2.67 MIU/ML (ref 0.46–4.68)
WBC NRBC COR # BLD: 6.13 10*3/MM3 (ref 3.2–9.8)

## 2017-11-30 PROCEDURE — 80053 COMPREHEN METABOLIC PANEL: CPT

## 2017-11-30 PROCEDURE — 80061 LIPID PANEL: CPT

## 2017-11-30 PROCEDURE — 36415 COLL VENOUS BLD VENIPUNCTURE: CPT

## 2017-11-30 PROCEDURE — 84436 ASSAY OF TOTAL THYROXINE: CPT

## 2017-11-30 PROCEDURE — 85025 COMPLETE CBC W/AUTO DIFF WBC: CPT

## 2017-11-30 PROCEDURE — 84443 ASSAY THYROID STIM HORMONE: CPT

## 2017-12-04 ENCOUNTER — OFFICE VISIT (OUTPATIENT)
Dept: FAMILY MEDICINE CLINIC | Facility: CLINIC | Age: 62
End: 2017-12-04

## 2017-12-04 VITALS
WEIGHT: 179 LBS | DIASTOLIC BLOOD PRESSURE: 78 MMHG | SYSTOLIC BLOOD PRESSURE: 132 MMHG | TEMPERATURE: 97.9 F | OXYGEN SATURATION: 98 % | HEART RATE: 66 BPM | HEIGHT: 67 IN | BODY MASS INDEX: 28.09 KG/M2

## 2017-12-04 DIAGNOSIS — J30.89 ENVIRONMENTAL AND SEASONAL ALLERGIES: Chronic | ICD-10-CM

## 2017-12-04 DIAGNOSIS — E78.01 FAMILIAL HYPERCHOLESTEROLEMIA: Chronic | ICD-10-CM

## 2017-12-04 DIAGNOSIS — R74.8 INCREASED LIVER ENZYMES: Primary | ICD-10-CM

## 2017-12-04 PROCEDURE — 99214 OFFICE O/P EST MOD 30 MIN: CPT | Performed by: INTERNAL MEDICINE

## 2017-12-04 RX ORDER — PRAVASTATIN SODIUM 80 MG/1
80 TABLET ORAL DAILY
Qty: 90 TABLET | Refills: 1 | Status: SHIPPED | OUTPATIENT
Start: 2017-12-04 | End: 2022-02-07

## 2017-12-04 RX ORDER — HYDROCORTISONE ACETATE 25 MG/1
25 SUPPOSITORY RECTAL 2 TIMES DAILY PRN
Qty: 30 SUPPOSITORY | Refills: 0 | Status: SHIPPED | OUTPATIENT
Start: 2017-12-04 | End: 2022-02-07

## 2018-02-13 RX ORDER — DILTIAZEM HYDROCHLORIDE 180 MG/1
CAPSULE, EXTENDED RELEASE ORAL
Qty: 90 CAPSULE | Refills: 1 | Status: SHIPPED | OUTPATIENT
Start: 2018-02-13 | End: 2018-09-07 | Stop reason: SDUPTHER

## 2018-03-08 ENCOUNTER — OFFICE VISIT (OUTPATIENT)
Dept: CARDIOLOGY | Facility: CLINIC | Age: 63
End: 2018-03-08

## 2018-03-08 VITALS
WEIGHT: 182.6 LBS | SYSTOLIC BLOOD PRESSURE: 118 MMHG | DIASTOLIC BLOOD PRESSURE: 74 MMHG | OXYGEN SATURATION: 97 % | HEIGHT: 67 IN | HEART RATE: 57 BPM | BODY MASS INDEX: 28.66 KG/M2

## 2018-03-08 DIAGNOSIS — I34.0 NON-RHEUMATIC MITRAL REGURGITATION: ICD-10-CM

## 2018-03-08 DIAGNOSIS — I47.1 ECTOPIC ATRIAL TACHYCARDIA (HCC): Primary | ICD-10-CM

## 2018-03-08 DIAGNOSIS — I10 ESSENTIAL HYPERTENSION: ICD-10-CM

## 2018-03-08 DIAGNOSIS — E78.2 MIXED HYPERLIPIDEMIA: ICD-10-CM

## 2018-03-08 PROCEDURE — 93000 ELECTROCARDIOGRAM COMPLETE: CPT | Performed by: INTERNAL MEDICINE

## 2018-03-08 PROCEDURE — 99214 OFFICE O/P EST MOD 30 MIN: CPT | Performed by: INTERNAL MEDICINE

## 2018-03-08 NOTE — PROGRESS NOTES
Cardiovascular Medicine   Filippo Rousseau M.D., Ph.D., [facility]      The patient returns to cardiology clinic for follow-up of the following cardiac problems:    PROBLEM LIST:      1. HTN  A. Preserved EF  B. Mild LVH  2. EAT  A. Dilt  3. HLD  4. Mild MR    Arrhythmia  Patient is a 62 y.o. female who presents for follow-up of EAT.  The patient was initially seen for palpitations.  Echocardiogram was structurally normal other than a mild degree of MR.  LV and RV EF was preserved.  Patient underwent Holter monitor which revealed episodes of an ectopic atrial tachycardia that correlated with symptoms.  Patient desires suppressive therapy.  She was initiated on diltiazem.  She returns in follow-up today.  Her current dose of diltiazem is 180 mg.  She's medication compliant.  She has had rare episodes of cardiac awareness. She has had no resting, exertional or nocturnal angina. No dyspnea. She has had no dizziness, lightheadedness or syncope.     Valve Disease  Tarah Hawkins is a 62 y.o. female who presents for follow-up of mitral valve disease. The patient was first diagnosed with valve disease in 2014 when she had an echocardiogram because of palpitations.Current status: Mild mitral regurgitation.  The patient has not had valve surgery.  The patient does not have a history of rheumatic fever. The patient does not need endocarditis prophylaxis.  The patient remains asymptomatic.    HTN  Concerning the patient's hypertension, the patient is managed by a PCP.  The patient is prescribed antihypertensives.  The patient is medication compliant.  Denies side effects.  Patient's laboratory evaluations are followed  by the PCP.     HLD  Concerning the patient's hyperlipidemia, the patient remains on a statin.  They are tolerating this very well.  Denies side effects.  Specifically, the patient denies any prior history of asymptomatic LFT elevation, myositis or myalgias.  Patient's laboratory evaluations are followed  closely by their PCP.        The following portions of the patient's history were reviewed and updated as appropriate: allergies, current medications, past family history, past medical history, past social history, past surgical history and problem list.    Review of Systems   Cardiovascular: Negative.    Respiratory: Negative.      family history includes Hypertension in her other; Other in her other; Stroke in her other.   reports that she has never smoked. She has never used smokeless tobacco. She reports that she does not drink alcohol or use illicit drugs.  No Known Allergies    Current Outpatient Prescriptions:   •  CARTIA  MG 24 hr capsule, TAKE 1 CAPSULE DAILY, Disp: 90 capsule, Rfl: 1  •  cetirizine (zyrTEC) 10 MG tablet, Take 10 mg by mouth., Disp: , Rfl:   •  diltiaZEM CD (CARDIZEM CD) 180 MG 24 hr capsule, Take 1 capsule by mouth Daily., Disp: 30 capsule, Rfl: 1  •  estradiol (MINIVELLE, VIVELLE-DOT) 0.1 MG/24HR patch, Place 1 patch on the skin 2 (Two) Times a Week., Disp: , Rfl:   •  FIBER PO, Take  by mouth. FIBER, Disp: , Rfl:   •  fluticasone (FLONASE) 50 MCG/ACT nasal spray, 50 g., Disp: , Rfl:   •  hydrocortisone (ANUSOL-HC) 25 MG suppository, Insert 1 suppository into the rectum 2 (Two) Times a Day As Needed for Hemorrhoids (rectal discomfort)., Disp: 30 suppository, Rfl: 0  •  Multiple Vitamins-Minerals (MULTIVITAMIN PO), Take 1 tablet by mouth Daily., Disp: , Rfl:   •  pravastatin (PRAVACHOL) 80 MG tablet, Take 1 tablet by mouth Daily., Disp: 90 tablet, Rfl: 1    Physical Exam:  Vitals:    03/08/18 1303   BP: 118/74   Pulse: 57   SpO2: 97%     Body mass index is 28.6 kg/(m^2).   Pulse Ox: Normal   General: alert, appears stated age and cooperative  Body Habitus: obese  HEENT: Head: Normocephalic, no lesions, without obvious abnormality. No arcus senilis, xanthelasma or xanthomas.  JVP: 7 cm of water at 45 degrees   Heart rate: normal    Heart Rhythm: regular     Heart Sounds: S1: normal  intensity  S2: normal intensity  S3: absent   S4: absent  Opening Snap: absent  A2-OS:  absent.   Pericardial rub: absent    Ejection click: None      Murmurs:  absent   Extremity: moves all extremities equally.       DATA REVIEWED:     EKG Report: Personally interpreted.     Name: Tarah Hawkins   Age: 62 y.o.   Gender: female  Date and time: 03/08/2018   Rate: 57   Rhythm: sinus bradycardia   QRS Axis: 36   ND Interval: Normal   QRS Duration: Normal   QTc: Normal   Voltages: Normal   Conduction Disturbances: none   Other Abnormalities: NS ST changes.     TTE, 2017:        Assessment/Plan        1. EAT.  The patient is now asymptomatic on a calcium channel blocker.  I discussed with her about continuing her current medication without changes.  -Continue diltiazem    2. Mild MR. ACC stage B. Last TTE: 2/2017.  She remains asymptomatic.  No audible murmurs on physical examination today.  -One-year follow-up with plans for repeat TTE in 2020    3. Hypertension, essential.  She is followed by her PCP.  Continue current treatment regimen.  Continue follow-up with PCP    4.  Cardiac risk assessment: Elevated  -Continue aspirin and statin for primary prevention; laboratory evaluations are followed by her PCP    5. Tobacco status: has never smoked      Follow-up: One year; Call or return to clinic prn if these symptoms worsen or fail to improve as anticipated.          This document has been electronically signed by Filippo Rousseau MD PhD on March 8, 2018 1:09 PM

## 2018-03-08 NOTE — PATIENT INSTRUCTIONS
Mitral Valve Regurgitation  Mitral valve regurgitation, also called mitral regurgitation, is a condition in which blood leaks from the mitral valve in the heart. The mitral valve is located between the upper left chamber (left atrium) and the lower left chamber (left ventricle) of the heart. Normally, this valve opens when the atrium pumps blood into the ventricle, and it closes when the ventricle pumps blood out to the body.  Mitral valve regurgitation happens when the mitral valve does not close properly. As a result, blood in the ventricle leaks back into the atrium. Mitral valve regurgitation causes the heart to work harder to pump blood. If the condition is mild, a person may not have symptoms. However, over time, this can lead to heart failure.  What are the causes?  This condition may be caused by:  · A condition in which the mitral valves do not close completely when the heart pumps blood (mitral valve prolapse).  · Infection, such as endocarditis or rheumatic fever.  · Damage to the mitral valve, such as from injury (trauma) to the heart, a problem present at birth (birth defect), or a heart attack.  · Certain medicines.  What increases the risk?  This condition is more likely to develop in people who have:  · Certain forms of heart disease.  · A family history of heart valve disease.  · Certain conditions that are present at birth (congenital).  You are also more likely to develop this condition if you have taken certain diet pills in the past.  What are the signs or symptoms?  Symptoms of this condition include:  · Shortness of breath with physical activity, like climbing stairs.  · Fast or irregular heartbeat.  · Cough.  · Suddenly waking up at night with difficulty breathing or needing to urinate.  · Heavy breathing.  · Extreme tiredness.  · Swelling in the lower legs, ankles, and feet.  In some cases of mild to moderate mitral regurgitation, there are no symptoms.  How is this diagnosed?  This  condition may be diagnosed based on the results of a physical exam. Your health care provider will listen to your heart for an abnormal heart sound (murmur). You may also have other tests, including:  · An echocardiogram. This test creates ultrasound images of the heart that allow your health care provider to see how the heart valves work while your heart is beating.  · Chest X-ray.  · Electrocardiogram (ECG). This is a test that records the electrical impulses of the heart.  · Cardiac catheterization. This test is used to look at the structure and function of the heart. A thin tube (catheter) is passed through the blood vessels and into the heart. Dye is injected into the blood vessels so the cardiac system can be seen on images that are taken.  How is this treated?  This condition may be treated with:  · Medicines. These may be given to treat symptoms and prevent complications.  · Surgery to repair or replace the mitral valve in severe, long-term (chronic) cases.  Follow these instructions at home:  Lifestyle   · Limit alcohol intake to no more than 1 drink a day for nonpregnant women and 2 drinks a day for men. One drink equals 12 oz of beer, 5 oz of wine, or 1½ oz of hard liquor.  · Do not use any products that contain nicotine or tobacco, such as cigarettes and e-cigarettes. If you need help quitting, ask your health care provider.  · Eat a heart-healthy diet that includes plenty of fresh fruits and vegetables, whole grains, low-fat (lean) protein, and low-fat dairy products. Consider working with a diet and nutrition specialist (dietitian) to help you make healthy food choices.  · Limit the amount of salt (sodium) in your diet. Avoid adding salt to foods, and avoid foods that are high in salt, such as:  ¨ Pickles.  ¨ Smoked and cured meats.  ¨ Processed foods.  · Maintain a healthy weight and stay physically active. Ask your health care provider to recommend activities that are safe for you.  · Try to get 7 or  more hours of sleep each night.  · Find ways to manage stress. If you need help with this, ask your health care provider.  General instructions     · Take over-the-counter and prescription medicines only as told by your health care provider.  · Work closely with your health care provider to manage any other health conditions you have, such as diabetes or high blood pressure.  · If you plan to become pregnant, talk with your health care provider first.  · Keep all follow-up visits as told by your health care provider. This is important.  Contact a health care provider if:  · You have a fever.  · You feel more tired than usual when doing physical activity.  · You have a dry cough.  Get help right away if:  · You have shortness of breath.  · You develop chest pain.  · You have swelling in your hands, feet, ankles, or abdomen that is getting worse.  · You have trouble staying awake or you faint.  · You feel dizzy or unsteady.  · You suddenly gain weight.  · You feel confused.  · Any of your symptoms begin to get worse.  These symptoms may represent a serious problem that is an emergency. Do not wait to see if the symptoms will go away. Get medical help right away. Call your local emergency services (911 in the [place] Do not drive yourself to the hospital.  Summary  · Mitral valve regurgitation, also called mitral regurgitation, is a condition in which blood leaks from a valve between two chambers of the heart (mitral valve).  · Depending on how severe your condition is, you may be treated with medicines or surgery.  · Practice heart-healthy habits to manage this condition. These include limiting alcohol, avoiding nicotine and tobacco, and eating a balanced diet that is low in salt (sodium).  This information is not intended to replace advice given to you by your health care provider. Make sure you discuss any questions you have with your health care provider.  Document Released: 03/07/2006 Document Revised: 09/29/2017  Document Reviewed: 09/29/2017  [employer] Interactive Patient Education © 2017 [employer]

## 2018-09-10 RX ORDER — DILTIAZEM HYDROCHLORIDE 180 MG/1
CAPSULE, EXTENDED RELEASE ORAL
Qty: 90 CAPSULE | Refills: 1 | Status: SHIPPED | OUTPATIENT
Start: 2018-09-10 | End: 2019-04-08 | Stop reason: SDUPTHER

## 2019-03-06 DIAGNOSIS — I47.1 ECTOPIC ATRIAL TACHYCARDIA (HCC): Primary | ICD-10-CM

## 2019-03-07 ENCOUNTER — OFFICE VISIT (OUTPATIENT)
Dept: CARDIOLOGY | Facility: CLINIC | Age: 64
End: 2019-03-07

## 2019-03-07 VITALS
WEIGHT: 180.5 LBS | BODY MASS INDEX: 28.33 KG/M2 | OXYGEN SATURATION: 98 % | HEIGHT: 67 IN | SYSTOLIC BLOOD PRESSURE: 116 MMHG | DIASTOLIC BLOOD PRESSURE: 80 MMHG | HEART RATE: 70 BPM

## 2019-03-07 DIAGNOSIS — E78.2 MIXED HYPERLIPIDEMIA: ICD-10-CM

## 2019-03-07 DIAGNOSIS — I47.1 ATRIAL ECTOPIC TACHYCARDIA (HCC): ICD-10-CM

## 2019-03-07 DIAGNOSIS — E66.3 OVERWEIGHT (BMI 25.0-29.9): ICD-10-CM

## 2019-03-07 DIAGNOSIS — I34.0 NON-RHEUMATIC MITRAL REGURGITATION: Primary | ICD-10-CM

## 2019-03-07 PROBLEM — I10 ESSENTIAL HYPERTENSION: Status: ACTIVE | Noted: 2019-03-07

## 2019-03-07 PROCEDURE — 99214 OFFICE O/P EST MOD 30 MIN: CPT | Performed by: INTERNAL MEDICINE

## 2019-03-07 PROCEDURE — 93000 ELECTROCARDIOGRAM COMPLETE: CPT | Performed by: INTERNAL MEDICINE

## 2019-03-07 NOTE — PROGRESS NOTES
Cardiovascular Medicine   Filippo Rousseau M.D., Ph.D., [facility]      The patient returns to cardiology clinic for follow-up of the following cardiac problems:    PROBLEM LIST:        1. MR  2. EAT  3. HTN  4. HLD    Tarah Hawkins is a 63 y.o. female who returns in follow-up today.  She was diagnosed with mild mitral valvular regurgitation in 2014 when she had an echocardiogram because of palpitations.  She remains in active clinical surveillance.  She remains asymptomatic from a cardiovascular standpoint.  Her next echocardiogram is due in 2020.  Her last echocardiogram was in 2017. She was also diagnosed with ectopic atrial tachycardia.  The patient was initially seen for palpitations.  Echocardiogram was structurally normal other than a mild degree of MR.  LV and RV EF was preserved.  Patient underwent Holter monitor which revealed episodes of an ectopic atrial tachycardia that correlated with symptoms.  Patient desired suppressive therapy.  She was initiated on diltiazem.   Her current dose of diltiazem is 180 mg.  She's medication compliant.  She has had rare episodes of cardiac awareness. She has had no resting, exertional or nocturnal angina. No dyspnea. She has had no dizziness, lightheadedness or syncope.  Concerning the patient's hyperlipidemia, the patient remains on a statin.  They are tolerating this very well.  Denies side effects.  Specifically, the patient denies any prior history of asymptomatic LFT elevation, myositis or myalgias.  Patient's laboratory evaluations are followed closely by their PCP.    The following portions of the patient's history were reviewed and updated as appropriate: allergies, current medications, past family history, past medical history, past social history, past surgical history and problem list.    Review of Systems   Cardiovascular: Negative.    Respiratory: Negative.      family history includes Hypertension in her other; Other in her other; Stroke in her other.    reports that  has never smoked. she has never used smokeless tobacco. She reports that she does not drink alcohol or use drugs.  No Known Allergies    Current Outpatient Medications:   •  CARTIA  MG 24 hr capsule, TAKE 1 CAPSULE DAILY, Disp: 90 capsule, Rfl: 1  •  cetirizine (zyrTEC) 10 MG tablet, Take 10 mg by mouth., Disp: , Rfl:   •  estradiol (MINIVELLE, VIVELLE-DOT) 0.1 MG/24HR patch, Place 1 patch on the skin 2 (Two) Times a Week., Disp: , Rfl:   •  FIBER PO, Take  by mouth. FIBER, Disp: , Rfl:   •  fluticasone (FLONASE) 50 MCG/ACT nasal spray, 50 g., Disp: , Rfl:   •  hydrocortisone (ANUSOL-HC) 25 MG suppository, Insert 1 suppository into the rectum 2 (Two) Times a Day As Needed for Hemorrhoids (rectal discomfort)., Disp: 30 suppository, Rfl: 0  •  Multiple Vitamins-Minerals (MULTIVITAMIN PO), Take 1 tablet by mouth Daily., Disp: , Rfl:   •  Omega-3 Fatty Acids (FISH OIL PO), Take  by mouth., Disp: , Rfl:   •  pravastatin (PRAVACHOL) 80 MG tablet, Take 1 tablet by mouth Daily., Disp: 90 tablet, Rfl: 1    Physical Exam:  Vitals:    03/07/19 1323   BP: 116/80   Pulse: 70   SpO2: 98%     Body mass index is 28.27 kg/m².   Pulse Ox: Normal   General: alert, appears stated age and cooperative  Body Habitus: obese  HEENT: Head: Normocephalic, no lesions, without obvious abnormality. No arcus senilis, xanthelasma or xanthomas.  JVP: 7 cm of water at 45 degrees   Heart rate: normal    Heart Rhythm: regular     Heart Sounds: S1: normal intensity  S2: normal intensity  S3: absent   S4: absent  Opening Snap: absent  A2-OS:  absent.   Pericardial rub: absent    Ejection click: None      Murmurs:  absent   Extremity: moves all extremities equally.       DATA REVIEWED:     Results for orders placed in visit on 01/30/17   Adult Transthoracic Echo Complete    Narrative · Left ventricular function is normal.  · Estimated EF appears to be in the range of 61 - 65%.  · Left ventricular diastolic function is normal.  · Right  "ventricle is normal in size and shape with normal systolic   function  · Mild mitral valve regurgitation is present  · No evidence of pulmonary hypertension          EKG: NSR      Assessment/Plan      1. Non-rheumatic mitral regurgitation. ACC stage B. There are no surgical indications at this time. The patient has not had valve surgery..   · The patient has been advised to remain in clinical surveillance every 12 months.  · Signs and symptoms of worsening valve disease discussed.  I've asked the patient contact me for an earlier appointment if these develop.  · I've recommended a repeat 2D TTE every 3 years.   · TTE due: 2020.  No indication based on 2017 ACC/AHA guidelines for IE prophylaxis for dental procedures: Optimal oral health is recommended through regular professional dental care and the use of appropriate dental products, such as manual, powered, and ultrasonic toothbrushes; dental floss; and other plaque-removal devices     2. Atrial ectopic tachycardia (CMS/HCC)   -Diltiazem   3. Cardiac Risk Assessment/Dyslipidemia/BP Goals/Glucose Status/Diet/Current Weight/Tobacco status/Screening:     Cardiac Risk:    The 10-year CVD risk score (D'Agostino, et al., 2008) is: 6.9%    Values used to calculate the score:      Age: 63 years      Sex: Female      Diabetic: No      Tobacco smoker: No      Systolic Blood Pressure: 116 mmHg      Is BP treated: Yes      HDL Cholesterol: 65 mg/dL      Total Cholesterol: 195 mg/dL    Interpretation: normal  -Continue follow-up visits with PCP for monitoring of labs  -Dietary changes: Increase soluble fiber: A printed copy of a low fat, low cholesterol diet was given.  -Reduce saturated fat, \"trans\" monounsaturated fatty acids, and cholesterol  -Exercise changes:  Encouraged daily aerobic activity.  -BP goal <130/80, Maintain BMI 18.5-24.9, Maintain hemoglobin A1c less than 7    General patient education regarding weight:   The patient's BMI is Body mass index is 28.27 kg/m².. "  This places the patient in weight class:  Overweight: 25.0-29.9kg/m2 .   -Weight loss hand-out  -Exercise intervention:   Hand out, increase aerobic activity  -Close PCP follow-up for Body mass index is 28.27 kg/m²..       Nicotine Status:   Nicotine status: is a former smoker    Former tobacco exposure:   -Congratulated on cessation  -Avoid tobacco       Return in about 1 year (around 3/7/2020).

## 2019-04-08 RX ORDER — DILTIAZEM HYDROCHLORIDE 180 MG/1
CAPSULE, EXTENDED RELEASE ORAL
Qty: 90 CAPSULE | Refills: 1 | Status: SHIPPED | OUTPATIENT
Start: 2019-04-08 | End: 2019-07-24 | Stop reason: SDUPTHER

## 2019-07-24 RX ORDER — DILTIAZEM HYDROCHLORIDE 180 MG/1
180 CAPSULE, COATED, EXTENDED RELEASE ORAL DAILY
Qty: 90 CAPSULE | Refills: 1 | Status: SHIPPED | OUTPATIENT
Start: 2019-07-24 | End: 2019-11-13 | Stop reason: SDUPTHER

## 2019-11-13 RX ORDER — DILTIAZEM HYDROCHLORIDE 180 MG/1
180 CAPSULE, COATED, EXTENDED RELEASE ORAL DAILY
Qty: 90 CAPSULE | Refills: 1 | Status: SHIPPED | OUTPATIENT
Start: 2019-11-13 | End: 2019-12-20 | Stop reason: SDUPTHER

## 2019-11-13 RX ORDER — DILTIAZEM HYDROCHLORIDE 180 MG/1
180 CAPSULE, COATED, EXTENDED RELEASE ORAL DAILY
Qty: 30 CAPSULE | Refills: 0 | Status: SHIPPED | OUTPATIENT
Start: 2019-11-13 | End: 2019-11-13 | Stop reason: SDUPTHER

## 2019-11-18 ENCOUNTER — TELEPHONE (OUTPATIENT)
Dept: CARDIOLOGY | Facility: CLINIC | Age: 64
End: 2019-11-18

## 2019-11-18 NOTE — TELEPHONE ENCOUNTER
----- Message from Malorie Sanchez sent at 11/18/2019  2:10 PM CST -----  Contact: [phone]  [facility] has a question about the Diltiazem (brand)??    Ref # [id number]      [facility] contacted. Dr. lamas will review the message and they will be contacted tomorrow.

## 2019-12-09 RX ORDER — DILTIAZEM HYDROCHLORIDE 180 MG/1
CAPSULE, COATED, EXTENDED RELEASE ORAL
Qty: 30 CAPSULE | Refills: 0 | OUTPATIENT
Start: 2019-12-09

## 2019-12-12 RX ORDER — DILTIAZEM HYDROCHLORIDE 180 MG/1
CAPSULE, COATED, EXTENDED RELEASE ORAL
Qty: 30 CAPSULE | Refills: 0 | OUTPATIENT
Start: 2019-12-12

## 2019-12-20 RX ORDER — DILTIAZEM HYDROCHLORIDE 180 MG/1
180 CAPSULE, COATED, EXTENDED RELEASE ORAL DAILY
Qty: 30 CAPSULE | Refills: 0 | Status: SHIPPED | OUTPATIENT
Start: 2019-12-20 | End: 2020-01-31 | Stop reason: SDUPTHER

## 2020-01-31 RX ORDER — DILTIAZEM HYDROCHLORIDE 180 MG/1
180 CAPSULE, COATED, EXTENDED RELEASE ORAL DAILY
Qty: 90 CAPSULE | Refills: 1 | Status: SHIPPED | OUTPATIENT
Start: 2020-01-31 | End: 2020-03-06 | Stop reason: SDUPTHER

## 2020-01-31 RX ORDER — DILTIAZEM HYDROCHLORIDE 180 MG/1
180 CAPSULE, COATED, EXTENDED RELEASE ORAL DAILY
Qty: 30 CAPSULE | Refills: 0 | Status: SHIPPED | OUTPATIENT
Start: 2020-01-31 | End: 2020-01-31 | Stop reason: SDUPTHER

## 2020-02-18 LAB
BH CV ECHO MEAS - ACS: 2 CM
BH CV ECHO MEAS - AO ISTHMUS: 3.2 CM
BH CV ECHO MEAS - AO MAX PG (FULL): 0.94 MMHG
BH CV ECHO MEAS - AO MAX PG: 3 MMHG
BH CV ECHO MEAS - AO ROOT AREA (BSA CORRECTED): 1.7
BH CV ECHO MEAS - AO ROOT AREA: 8.6 CM^2
BH CV ECHO MEAS - AO ROOT DIAM: 3.3 CM
BH CV ECHO MEAS - AO V2 MAX: 86 CM/SEC
BH CV ECHO MEAS - AVA(V,A): 2.9 CM^2
BH CV ECHO MEAS - AVA(V,D): 2.9 CM^2
BH CV ECHO MEAS - BSA(HAYCOCK): 2 M^2
BH CV ECHO MEAS - BSA: 1.9 M^2
BH CV ECHO MEAS - BZI_BMI: 28.2 KILOGRAMS/M^2
BH CV ECHO MEAS - BZI_METRIC_HEIGHT: 170.2 CM
BH CV ECHO MEAS - BZI_METRIC_WEIGHT: 81.6 KG
BH CV ECHO MEAS - EDV(CUBED): 110.6 ML
BH CV ECHO MEAS - EDV(MOD-SP2): 57 ML
BH CV ECHO MEAS - EDV(MOD-SP4): 61 ML
BH CV ECHO MEAS - EDV(TEICH): 107.5 ML
BH CV ECHO MEAS - EF(CUBED): 80.2 %
BH CV ECHO MEAS - EF(MOD-SP2): 61.4 %
BH CV ECHO MEAS - EF(MOD-SP4): 42.6 %
BH CV ECHO MEAS - EF(TEICH): 72.5 %
BH CV ECHO MEAS - ESV(CUBED): 22 ML
BH CV ECHO MEAS - ESV(MOD-SP2): 22 ML
BH CV ECHO MEAS - ESV(MOD-SP4): 35 ML
BH CV ECHO MEAS - ESV(TEICH): 29.6 ML
BH CV ECHO MEAS - FS: 41.7 %
BH CV ECHO MEAS - IVS/LVPW: 1.1
BH CV ECHO MEAS - IVSD: 1.3 CM
BH CV ECHO MEAS - LA DIMENSION: 3.7 CM
BH CV ECHO MEAS - LA/AO: 1.1
BH CV ECHO MEAS - LV DIASTOLIC VOL/BSA (35-75): 31.5 ML/M^2
BH CV ECHO MEAS - LV MASS(C)D: 232.3 GRAMS
BH CV ECHO MEAS - LV MASS(C)DI: 120.1 GRAMS/M^2
BH CV ECHO MEAS - LV MAX PG: 2 MMHG
BH CV ECHO MEAS - LV MEAN PG: 1 MMHG
BH CV ECHO MEAS - LV SYSTOLIC VOL/BSA (12-30): 18.1 ML/M^2
BH CV ECHO MEAS - LV V1 MAX: 71 CM/SEC
BH CV ECHO MEAS - LV V1 MEAN: 44.3 CM/SEC
BH CV ECHO MEAS - LV V1 VTI: 19.3 CM
BH CV ECHO MEAS - LVIDD: 4.8 CM
BH CV ECHO MEAS - LVIDS: 2.8 CM
BH CV ECHO MEAS - LVLD AP2: 7.1 CM
BH CV ECHO MEAS - LVLD AP4: 7.2 CM
BH CV ECHO MEAS - LVLS AP2: 5.6 CM
BH CV ECHO MEAS - LVLS AP4: 6.7 CM
BH CV ECHO MEAS - LVOT AREA (M): 3.5 CM^2
BH CV ECHO MEAS - LVOT AREA: 3.5 CM^2
BH CV ECHO MEAS - LVOT DIAM: 2.1 CM
BH CV ECHO MEAS - LVPWD: 1.2 CM
BH CV ECHO MEAS - MR MAX PG: 104 MMHG
BH CV ECHO MEAS - MR MAX VEL: 510 CM/SEC
BH CV ECHO MEAS - MV A MAX VEL: 89.1 CM/SEC
BH CV ECHO MEAS - MV DEC SLOPE: 418 CM/SEC^2
BH CV ECHO MEAS - MV E MAX VEL: 81.8 CM/SEC
BH CV ECHO MEAS - MV E/A: 0.92
BH CV ECHO MEAS - MV P1/2T MAX VEL: 91.2 CM/SEC
BH CV ECHO MEAS - MV P1/2T: 63.9 MSEC
BH CV ECHO MEAS - MVA P1/2T LCG: 2.4 CM^2
BH CV ECHO MEAS - MVA(P1/2T): 3.4 CM^2
BH CV ECHO MEAS - PA MAX PG: 1.8 MMHG
BH CV ECHO MEAS - PA V2 MAX: 67.6 CM/SEC
BH CV ECHO MEAS - RAP SYSTOLE: 5 MMHG
BH CV ECHO MEAS - RVDD: 3 CM
BH CV ECHO MEAS - RVSP: 25 MMHG
BH CV ECHO MEAS - SI(CUBED): 45.8 ML/M^2
BH CV ECHO MEAS - SI(LVOT): 34.6 ML/M^2
BH CV ECHO MEAS - SI(MOD-SP2): 18.1 ML/M^2
BH CV ECHO MEAS - SI(MOD-SP4): 13.4 ML/M^2
BH CV ECHO MEAS - SI(TEICH): 40.3 ML/M^2
BH CV ECHO MEAS - SV(CUBED): 88.6 ML
BH CV ECHO MEAS - SV(LVOT): 66.8 ML
BH CV ECHO MEAS - SV(MOD-SP2): 35 ML
BH CV ECHO MEAS - SV(MOD-SP4): 26 ML
BH CV ECHO MEAS - SV(TEICH): 78 ML
MAXIMAL PREDICTED HEART RATE: 155 BPM
STRESS TARGET HR: 132 BPM

## 2020-03-05 DIAGNOSIS — I34.0 MITRAL VALVE INSUFFICIENCY, UNSPECIFIED ETIOLOGY: Primary | ICD-10-CM

## 2020-03-06 ENCOUNTER — OFFICE VISIT (OUTPATIENT)
Dept: CARDIOLOGY | Facility: CLINIC | Age: 65
End: 2020-03-06

## 2020-03-06 VITALS
HEART RATE: 75 BPM | DIASTOLIC BLOOD PRESSURE: 80 MMHG | BODY MASS INDEX: 28.12 KG/M2 | SYSTOLIC BLOOD PRESSURE: 134 MMHG | WEIGHT: 179.2 LBS | OXYGEN SATURATION: 99 % | HEIGHT: 67 IN

## 2020-03-06 DIAGNOSIS — E66.3 OVERWEIGHT (BMI 25.0-29.9): ICD-10-CM

## 2020-03-06 DIAGNOSIS — I10 ESSENTIAL HYPERTENSION: ICD-10-CM

## 2020-03-06 DIAGNOSIS — I34.0 NON-RHEUMATIC MITRAL REGURGITATION: Primary | ICD-10-CM

## 2020-03-06 DIAGNOSIS — I47.1 ATRIAL ECTOPIC TACHYCARDIA (HCC): ICD-10-CM

## 2020-03-06 DIAGNOSIS — E78.2 MIXED HYPERLIPIDEMIA: ICD-10-CM

## 2020-03-06 PROCEDURE — 93000 ELECTROCARDIOGRAM COMPLETE: CPT | Performed by: INTERNAL MEDICINE

## 2020-03-06 PROCEDURE — 99213 OFFICE O/P EST LOW 20 MIN: CPT | Performed by: INTERNAL MEDICINE

## 2020-03-06 RX ORDER — DILTIAZEM HYDROCHLORIDE 180 MG/1
180 CAPSULE, COATED, EXTENDED RELEASE ORAL DAILY
Qty: 90 CAPSULE | Refills: 1 | Status: SHIPPED | OUTPATIENT
Start: 2020-03-06 | End: 2020-09-08 | Stop reason: SDUPTHER

## 2020-03-06 NOTE — PROGRESS NOTES
Cardiovascular Medicine   Filippo Rousseau M.D., Ph.D., [facility]      The patient returns to cardiology clinic for follow-up of the following cardiac problems:    PROBLEM LIST:        1. MR  2. EAT  3. Risks: HTN, HLD, Overweight      Tarah Hawkins is a 64 y.o. female who returns in follow-up today.  She was diagnosed with mild mitral valvular regurgitation in 2014 when she had an echocardiogram because of palpitations.  She remains in active clinical surveillance.  She remains asymptomatic from a cardiovascular standpoint.  She had an echocardiogram prior to her appointment today.  This shows more moderate degree of mitral regurgitation.  LV dimensions remain acceptable.  Patient underwent Holter monitor which revealed episodes of an ectopic atrial tachycardia that correlated with symptoms.  Patient desired suppressive therapy.  She was initiated on diltiazem.   Her current dose of diltiazem is 180 mg.  She's medication compliant.  She has had rare episodes of cardiac awareness. She has had no resting, exertional or nocturnal angina. No dyspnea. She has had no dizziness, lightheadedness or syncope.  Concerning the patient's hyperlipidemia, the patient remains on a statin.  They are tolerating this very well.  Denies side effects.  Specifically, the patient denies any prior history of asymptomatic LFT elevation, myositis or myalgias.  Patient's laboratory evaluations are followed closely by their PCP.    The following portions of the patient's history were reviewed and updated as appropriate: allergies, current medications, past family history, past medical history, past social history, past surgical history and problem list.    Review of Systems   Cardiovascular: Negative.    Respiratory: Negative.      family history includes Hypertension in an other family member; Other in an other family member; Stroke in an other family member.   reports that she has never smoked. She has never used smokeless tobacco. She  reports that she does not drink alcohol or use drugs.  No Known Allergies    Current Outpatient Medications:   •  cetirizine (zyrTEC) 10 MG tablet, Take 10 mg by mouth., Disp: , Rfl:   •  dilTIAZem CD (CARTIA XT) 180 MG 24 hr capsule, Take 1 capsule by mouth Daily., Disp: 90 capsule, Rfl: 1  •  estradiol (MINIVELLE, VIVELLE-DOT) 0.1 MG/24HR patch, Place 1 patch on the skin 2 (Two) Times a Week., Disp: , Rfl:   •  FIBER PO, Take  by mouth. FIBER, Disp: , Rfl:   •  fluticasone (FLONASE) 50 MCG/ACT nasal spray, 50 g., Disp: , Rfl:   •  hydrocortisone (ANUSOL-HC) 25 MG suppository, Insert 1 suppository into the rectum 2 (Two) Times a Day As Needed for Hemorrhoids (rectal discomfort)., Disp: 30 suppository, Rfl: 0  •  Multiple Vitamins-Minerals (MULTIVITAMIN PO), Take 1 tablet by mouth Daily., Disp: , Rfl:   •  Omega-3 Fatty Acids (FISH OIL PO), Take  by mouth., Disp: , Rfl:   •  pravastatin (PRAVACHOL) 80 MG tablet, Take 1 tablet by mouth Daily., Disp: 90 tablet, Rfl: 1    Physical Exam:  Vitals:    03/06/20 1020   BP: 134/80   Pulse: 75   SpO2: 99%     Body mass index is 28.07 kg/m².   Pulse Ox: Normal   General: alert, appears stated age and cooperative  Body Habitus: obese  HEENT: Head: Normocephalic, no lesions, without obvious abnormality. No arcus senilis, xanthelasma or xanthomas.  JVP: 7 cm of water at 45 degrees   Heart rate: normal    Heart Rhythm: regular     Heart Sounds: S1: normal intensity  S2: increased intensity  S3: absent   S4: absent  Opening Snap: absent  A2-OS:  absent.   Pericardial rub: absent    Ejection click: None      Murmurs:  absent   Extremity: moves all extremities equally.       DATA REVIEWED:           Results for orders placed in visit on 03/07/19   Adult Transthoracic Echo Complete W/ Cont if Necessary Per Protocol    Narrative · Left ventricular systolic function is low normal. LVEF is 51-55%. Mild   concentric hypertrophy with normal diastolic function.  · Right ventricle systolic  function is normal.  · Mitral annular calcification with mild to moderate mitral regurgitation.              Assessment/Plan      1. Non-rheumatic mitral regurgitation. ACC stage B. There are no surgical indications at this time. Signs and symptoms of worsening valve disease discussed.  I've asked the patient contact me for an earlier appointment if these develop. The patient has been advised to remain in clinical surveillance. I've recommended a repeat 2D TTE every 2 years with the next TTE due: 2022.  • No indication based on 2017 ACC/AHA guidelines for IE prophylaxis for dental procedures: Optimal oral health is recommended through regular professional dental care and the use of appropriate dental products, such as manual, powered, and ultrasonic toothbrushes; dental floss; and other plaque-removal devices    2. Atrial ectopic tachycardia (CMS/HCC)  -Diltiazem    3. Cardiac Risk Assessments based on 2019 ACCF guidelines:  A team-based care approach is recommended for the control of risk factors associated with ASCAD.  As such, Tarah Hawkins was requested to have ongoing follow-up with their PCP. A PCP can provide the detailed monitoring that is required for management of risk factors such as essential HTN. Essential HTN is a significant risk factor for stroke, heart disease and vascular disease. I've recommended the patient continue current medications, if any, as prescribed by the primary care provider. I recommended they have close follow-up for ongoing mgmt of this and the medical comorbidities associated with HTN with their PCP.  A diet emphasizing intake of vegetables, fruits, nuts, whole grains and fish is recommended. Physical activity recommendations were provided by literature. They were also provided with information regarding maintaining a healthy weight, heart-healthy dietary pattern and DASH information.  Goal blood pressure less than 130/80.  The patient's BMI is recommended to be calculated at  least annually.  The patient's BMI is Body mass index is 28.07 kg/m²..  Tobacco status is assessed at every visit based on established guidelines.  The patient's nicotine status: has never smoked     Return in about 1 year (around 3/6/2021).

## 2020-03-06 NOTE — PATIENT INSTRUCTIONS
Mitral Valve Regurgitation    Mitral valve regurgitation, also called mitral regurgitation, is a condition in which some blood leaks back (regurgitates) through the mitral valve in the heart. The mitral valve is located between the upper left chamber (left atrium) and the lower left chamber (left ventricle) of the heart. When blood travels through the heart, it goes from the left atrium to the left ventricle and then out to the body. Normally, the mitral valve opens when the atrium pumps blood into the ventricle, and it closes when the ventricle pumps blood out to the body.  Mitral valve regurgitation happens when the mitral valve does not close properly. As a result, blood in the ventricle leaks back into the atrium. Mitral valve regurgitation causes the heart to work harder to pump blood. If the condition is mild, a person may not have symptoms. However, over time, this can lead to heart failure.  What are the causes?  This condition may be caused by:  · A condition in which the mitral valve does not close completely when the heart pumps blood (mitral valve prolapse).  · Heart valve infection (endocarditis).  · Certain types of heart disease.  · A condition that causes inflammation of the heart, blood vessels, or joints (rheumatic fever).  · Certain conditions that are present at birth (congenital heart defect).  · Previous radiation therapy to the chest area.  · Damage to the mitral valve, such as from injury (trauma) to the heart or a heart attack.  · Certain combinations of weight-loss (anti-obesity) medicines.  What are the signs or symptoms?  In some cases of mild to moderate mitral regurgitation, there are no symptoms.  Symptoms of this condition include:  · Shortness of breath.  · Fatigue.  · Activity intolerance.  · Cough.  Severe symptoms of this condition include:  · Suddenly waking up at night with difficulty breathing.  · Fast or irregular heartbeat.  · Swelling in the lower legs, ankles, and  feet.  · Fluid in the lungs that makes it very hard to breathe (pulmonary edema).  How is this diagnosed?  This condition may be diagnosed based on:  · A physical exam. Your health care provider will listen to your heart for an abnormal heart sound (murmur).  · Tests to confirm the diagnosis. These may include:  ? A test that creates ultrasound images of the heart (echocardiogram). This test allows your health care provider to see how the heart valves work while your heart is beating.  ? Chest X-ray.  ? A test that records the electrical impulses of the heart (electrocardiogram, or ECG).  ? A test that looks at the structure and function of the heart (cardiac catheterization).  How is this treated?  This condition may be treated with:  · Medicines. These may be given to treat symptoms and prevent complications.  · Surgery or catheter-based procedures to repair or replace the mitral valve. This may be done in severe, long-term (chronic) cases.  Follow these instructions at home:  Eating and drinking    · Eat a heart-healthy diet that includes whole grains, fresh fruits and vegetables, low-fat (lean) proteins, and low-fat or nonfat dairy products. Consider working with a dietitian to help you make healthy food choices.  · Limit how much salt (sodium) you eat as told by your health care provider. Follow instructions from your health care provider about any other eating or drinking restrictions, such as limiting foods that are high in fat and processed sugars.  · Use healthy cooking methods, such as roasting, grilling, broiling, baking, poaching, steaming, or stir-frying.  Alcohol use  · Do not drink alcohol if:  ? Your health care provider tells you not to drink.  ? You are pregnant, may be pregnant, or are planning to become pregnant.  · If you drink alcohol:  ? Limit how much you use to:  § 0-1 drink a day for women.  § 0-2 drinks a day for men.  ? Be aware of how much alcohol is in your drink. In the [place] one drink  equals one 12 oz bottle of beer (355 mL), one 5 oz glass of wine (148 mL), or one 1½ oz glass of hard liquor (44 mL).  Activity  · Return to your normal activities as told by your health care provider. Ask your health care provider what activities are safe for you.  · Regular exercise is important for the health of your heart and for maintaining a healthy weight. Ask your health care provider what type of exercise is safe for you. You may need to avoid strenuous exercise.  Lifestyle  · Maintain a healthy weight.  · Do not use any products that contain nicotine or tobacco, such as cigarettes, e-cigarettes, and chewing tobacco. If you need help quitting, ask your health care provider.  · Find ways to manage stress. If you need help with this, ask your health care provider.  General instructions  · Take over-the-counter and prescription medicines only as told by your health care provider.  · Work closely with your health care provider to manage any other health conditions you have, such as diabetes or high blood pressure.  · If you plan to become pregnant, talk with your health care provider first.  · Keep all follow-up visits as told by your health care provider. This is important.  Contact a health care provider if you:  · Have a fever.  · Feel more tired than usual when doing physical activity.  · Have a dry cough.  Get help right away if you:  · Have shortness of breath.  · Develop chest pain.  · Have swelling in your hands, feet, ankles, or abdomen that is getting worse.  · Have trouble staying awake or you faint.  · Feel dizzy or unsteady.  · Suddenly gain weight.  · Feel confused.  These symptoms may represent a serious problem that is an emergency. Do not wait to see if the symptoms will go away. Get medical help right away. Call your local emergency services (911 in the [place] Do not drive yourself to the hospital.  Summary  · Mitral valve regurgitation, also called mitral regurgitation, is a condition in  which some blood leaks back (regurgitates) through the mitral valve in the heart.  · Depending on how severe your condition is, you may be treated with medicines, catheter-based procedures, or surgery.  · Practice heart-healthy habits to manage this condition. These include limiting alcohol, avoiding nicotine and tobacco, and eating a heart-healthy diet that is low in salt (sodium).  This information is not intended to replace advice given to you by your health care provider. Make sure you discuss any questions you have with your health care provider.  Document Released: 03/07/2006 Document Revised: 12/01/2019 Document Reviewed: 12/01/2019  [employer] Interactive Patient Education © 2020 [employer]      Preventing Unhealthy Weight Gain, Adult  Staying at a healthy weight is important to your overall health. When fat builds up in your body, you may become overweight or obese. Being overweight or obese increases your risk of developing certain health problems, such as heart disease, diabetes, sleeping problems, joint problems, and some types of cancer.  Unhealthy weight gain is often the result of making unhealthy food choices or not getting enough exercise. You can make changes to your lifestyle to prevent obesity and stay as healthy as possible.  What nutrition changes can be made?    · Eat only as much as your body needs. To do this:  ? Pay attention to signs that you are hungry or full. Stop eating as soon as you feel full.  ? If you feel hungry, try drinking water first before eating. Drink enough water so your urine is clear or pale yellow.  ? Eat smaller portions. Pay attention to portion sizes when eating out.  ? Look at serving sizes on food labels. Most foods contain more than one serving per container.  ? Eat the recommended number of calories for your gender and activity level. For most active people, a daily total of 2,000 calories is appropriate. If you are trying to lose weight or are not very active,  you may need to eat fewer calories. Talk with your health care provider or a diet and nutrition specialist (dietitian) about how many calories you need each day.  · Choose healthy foods, such as:  ? Fruits and vegetables. At each meal, try to fill at least half of your plate with fruits and vegetables.  ? Whole grains, such as whole-wheat bread, brown rice, and quinoa.  ? Lean meats, such as chicken or fish.  ? Other healthy proteins, such as beans, eggs, or tofu.  ? Healthy fats, such as nuts, seeds, fatty fish, and olive oil.  ? Low-fat or fat-free dairy products.  · Check food labels, and avoid food and drinks that:  ? Are high in calories.  ? Have added sugar.  ? Are high in sodium.  ? Have saturated fats or trans fats.  · Cook foods in healthier ways, such as by baking, broiling, or grilling.  · Make a meal plan for the week, and shop with a grocery list to help you stay on track with your purchases. Try to avoid going to the grocery store when you are hungry.  · When grocery shopping, try to shop around the outside of the store first, where the fresh foods are. Doing this helps you to avoid prepackaged foods, which can be high in sugar, salt (sodium), and fat.  What lifestyle changes can be made?    · Exercise for 30 or more minutes on 5 or more days each week. Exercising may include brisk walking, yard work, biking, running, swimming, and team sports like basketball and soccer. Ask your health care provider which exercises are safe for you.  · Do muscle-strengthening activities, such as lifting weights or using resistance bands, on 2 or more days a week.  · Do not use any products that contain nicotine or tobacco, such as cigarettes and e-cigarettes. If you need help quitting, ask your health care provider.  · Limit alcohol intake to no more than 1 drink a day for nonpregnant women and 2 drinks a day for men. One drink equals 12 oz of beer, 5 oz of wine, or 1½ oz of hard liquor.  · Try to get 7-9 hours of  sleep each night.  What other changes can be made?  · Keep a food and activity journal to keep track of:  ? What you ate and how many calories you had. Remember to count the calories in sauces, dressings, and side dishes.  ? Whether you were active, and what exercises you did.  ? Your calorie, weight, and activity goals.  · Check your weight regularly. Track any changes. If you notice you have gained weight, make changes to your diet or activity routine.  · Avoid taking weight-loss medicines or supplements. Talk to your health care provider before starting any new medicine or supplement.  · Talk to your health care provider before trying any new diet or exercise plan.  Why are these changes important?  Eating healthy, staying active, and having healthy habits can help you to prevent obesity. Those changes also:  · Help you manage stress and emotions.  · Help you connect with friends and family.  · Improve your self-esteem.  · Improve your sleep.  · Prevent long-term health problems.  What can happen if changes are not made?  Being obese or overweight can cause you to develop joint or bone problems, which can make it hard for you to stay active or do activities you enjoy. Being obese or overweight also puts stress on your heart and lungs and can lead to health problems like diabetes, heart disease, and some cancers.  Where to find more information  Talk with your health care provider or a dietitian about healthy eating and healthy lifestyle choices. You may also find information from:  · [place] Department of Agriculture, MyPlate: www.choosemyplate.gov  · American Heart Association: www.heart.org  · Centers for Disease Control and Prevention: www.cdc.gov  Summary  · Staying at a healthy weight is important to your overall health. It helps you to prevent certain diseases and health problems, such as heart disease, diabetes, joint problems, sleep disorders, and some types of cancer.  · Being obese or overweight can cause  you to develop joint or bone problems, which can make it hard for you to stay active or do activities you enjoy.  · You can prevent unhealthy weight gain by eating a healthy diet, exercising regularly, not smoking, limiting alcohol, and getting enough sleep.  · Talk with your health care provider or a dietitian for guidance about healthy eating and healthy lifestyle choices.  This information is not intended to replace advice given to you by your health care provider. Make sure you discuss any questions you have with your health care provider.  Document Released: 12/19/2017 Document Revised: 09/28/2018 Document Reviewed: 01/24/2018  [employer] Interactive Patient Education © 2020 [employer]

## 2020-09-08 RX ORDER — DILTIAZEM HYDROCHLORIDE 180 MG/1
180 CAPSULE, COATED, EXTENDED RELEASE ORAL DAILY
Qty: 90 CAPSULE | Refills: 1 | Status: SHIPPED | OUTPATIENT
Start: 2020-09-08 | End: 2021-03-12 | Stop reason: SDUPTHER

## 2021-03-12 ENCOUNTER — OFFICE VISIT (OUTPATIENT)
Dept: CARDIOLOGY | Facility: CLINIC | Age: 66
End: 2021-03-12

## 2021-03-12 VITALS
HEART RATE: 65 BPM | HEIGHT: 67 IN | SYSTOLIC BLOOD PRESSURE: 118 MMHG | DIASTOLIC BLOOD PRESSURE: 84 MMHG | WEIGHT: 184.2 LBS | OXYGEN SATURATION: 99 % | BODY MASS INDEX: 28.91 KG/M2

## 2021-03-12 DIAGNOSIS — I34.0 NON-RHEUMATIC MITRAL REGURGITATION: Primary | ICD-10-CM

## 2021-03-12 DIAGNOSIS — E66.3 OVERWEIGHT (BMI 25.0-29.9): ICD-10-CM

## 2021-03-12 DIAGNOSIS — I47.1 ATRIAL ECTOPIC TACHYCARDIA (HCC): ICD-10-CM

## 2021-03-12 DIAGNOSIS — I34.0 MITRAL VALVE INSUFFICIENCY, UNSPECIFIED ETIOLOGY: Primary | ICD-10-CM

## 2021-03-12 PROCEDURE — 99214 OFFICE O/P EST MOD 30 MIN: CPT | Performed by: INTERNAL MEDICINE

## 2021-03-12 PROCEDURE — 93000 ELECTROCARDIOGRAM COMPLETE: CPT | Performed by: INTERNAL MEDICINE

## 2021-03-12 RX ORDER — DILTIAZEM HYDROCHLORIDE 180 MG/1
180 CAPSULE, COATED, EXTENDED RELEASE ORAL DAILY
Qty: 90 CAPSULE | Refills: 1 | Status: SHIPPED | OUTPATIENT
Start: 2021-03-12 | End: 2021-06-21 | Stop reason: SDUPTHER

## 2021-03-12 NOTE — PATIENT INSTRUCTIONS
TRANSITION OF CARE TO A NEW CARDIOLOGIST    As we discussed, you have cardiovascular problems that require follow-up. We discussed transitioning your care to a cardiologist who sees patients with your issues, since my clinic is transitioning to pulmonary hypertension, heart failure and valvular heart disease.     The  will arrange you a new appointment with the cardiologist of your choice. Prior to that appointment, I will continue to refill your cardiovascular prescriptions. In the interim, please continue to call my office and ask for Tara for any issues.    It has been a pleasure taking care of you.    I wish you the best in health,    Filippo Rousseau M.D., Ph.D., [facility]

## 2021-03-12 NOTE — PROGRESS NOTES
Cardiovascular Medicine   Filippo Rousseau M.D., Ph.D., [facility]      The patient returns to cardiology clinic for follow-up of the following cardiac problems:    PROBLEM LIST:        1. MR  2. EAT  3. Risks: HTN, HLD, Overweight      Tarah Hawkins is a 65 y.o. female who returns in follow-up today.      She returns today with a chief complaint of valvular heart disease.  She was diagnosed with mild mitral regurgitation in 2014.  She had a structurally normal mitral valve.  A 2D TTE was performed because she was complaining of palpitations.  Her last 2D echocardiogram showed a more moderate degree of mitral regurgitation.  She had preserved biventricular function with normal pulmonary pressures.  LV dimensions were acceptable.  She was also diagnosed with an ectopic atrial tachycardia that correlated with symptoms.  She desires suppressive therapy.  She was placed on diltiazem.  She has been asymptomatic since her last visit.  She endorses rare cardiac awareness.  No angina.  No dizziness, lightheadedness or syncope.  No dyspnea.  She continues to see her PCP for management of dyslipidemia.      The following portions of the patient's history were reviewed and updated as appropriate: allergies, current medications, past family history, past medical history, past social history, past surgical history and problem list.    Review of Systems   Cardiovascular: Negative.    Respiratory: Negative.    All other systems reviewed and are negative.    family history includes Hypertension in an other family member; Other in an other family member; Stroke in an other family member.   reports that she has never smoked. She has never used smokeless tobacco. She reports that she does not drink alcohol and does not use drugs.  No Known Allergies    Current Outpatient Medications:   •  cetirizine (zyrTEC) 10 MG tablet, Take 10 mg by mouth., Disp: , Rfl:   •  dilTIAZem CD (Cartia XT) 180 MG 24 hr capsule, Take 1 capsule by mouth  Daily., Disp: 90 capsule, Rfl: 1  •  estradiol (MINIVELLE, VIVELLE-DOT) 0.1 MG/24HR patch, Place 1 patch on the skin 2 (Two) Times a Week., Disp: , Rfl:   •  FIBER PO, Take  by mouth. FIBER, Disp: , Rfl:   •  fluticasone (FLONASE) 50 MCG/ACT nasal spray, 50 g., Disp: , Rfl:   •  hydrocortisone (ANUSOL-HC) 25 MG suppository, Insert 1 suppository into the rectum 2 (Two) Times a Day As Needed for Hemorrhoids (rectal discomfort)., Disp: 30 suppository, Rfl: 0  •  Multiple Vitamins-Minerals (MULTIVITAMIN PO), Take 1 tablet by mouth Daily., Disp: , Rfl:   •  Omega-3 Fatty Acids (FISH OIL PO), Take  by mouth., Disp: , Rfl:   •  pravastatin (PRAVACHOL) 80 MG tablet, Take 1 tablet by mouth Daily., Disp: 90 tablet, Rfl: 1    Physical Exam:  Vitals:    03/12/21 0850   BP: 118/84   Pulse: 65   SpO2: 99%     Body mass index is 28.85 kg/m².   Pulse Ox: Normal   General: alert, appears stated age and cooperative  Body Habitus: obese  HEENT: Head: Normocephalic, no lesions, without obvious abnormality. No arcus senilis, xanthelasma or xanthomas.  JVP: 7 cm of water at 45 degrees   Heart rate: normal    Heart Rhythm: regular     Heart Sounds: S1: normal intensity  S2: increased intensity  S3: absent   S4: absent  Opening Snap: absent  A2-OS:  absent.   Pericardial rub: absent    Ejection click: None      Murmurs:  absent   Extremity: moves all extremities equally.       DATA REVIEWED:     EKG: Today, NSR      Results for orders placed in visit on 03/07/19    Adult Transthoracic Echo Complete W/ Cont if Necessary Per Protocol    Interpretation Summary  · Left ventricular systolic function is low normal. LVEF is 51-55%. Mild concentric hypertrophy with normal diastolic function.  · Right ventricle systolic function is normal.  · Mitral annular calcification with mild to moderate mitral regurgitation.        TTE with mild to moderate MR.       Assessment/Plan      1. Non-rheumatic mitral regurgitation. ACC stage B. There are no  surgical indications at this time. Signs and symptoms of worsening valve disease discussed.  I've asked the patient contact me for an earlier appointment if these develop. The patient has been advised to remain in clinical surveillance. I've recommended a repeat 2D TTE every 2 years with the next TTE due: 2022.  • No indication based on 2017 ACC/AHA guidelines for IE prophylaxis for dental procedures: Optimal oral health is recommended through regular professional dental care and the use of appropriate dental products, such as manual, powered, and ultrasonic toothbrushes; dental floss; and other plaque-removal devices    2. Atrial ectopic tachycardia (CMS/HCC)  -Diltiazem    3. Cardiac Risk Assessment based on 2019 ACCF guidelines: Blood pressure assessment, dyslipidemia goals, diet, bodyweight and exercise:  A team-based approach is recommended for the control of risk factors associated with ASCAD.  As such, Tarah Hawkins was requested to have ongoing follow-up with their PCP. BP is an important modifiable risk factor. I have asked the patient to see their PCP for BP monitoring and management, as needed. A diet emphasizing intake of vegetables, fruits, nuts, whole grains and fish is recommended. Physical activity recommendations have been provided. The patient's BMI is recommended to be calculated at least annually.  The patient's BMI is Body mass index is 28.85 kg/m²..  Tobacco status is assessed at every visit based on established guidelines.  The patient's nicotine status: Social History    Tobacco Use      Smoking status: Never Smoker      Smokeless tobacco: Never Used    Transition care to Dr. Maier.  She will need appointment within 4-6 months or so.

## 2021-03-15 LAB
QT INTERVAL: 394 MS
QTC INTERVAL: 409 MS

## 2021-06-21 RX ORDER — DILTIAZEM HYDROCHLORIDE 180 MG/1
180 CAPSULE, COATED, EXTENDED RELEASE ORAL DAILY
Qty: 90 CAPSULE | Refills: 3 | Status: SHIPPED | OUTPATIENT
Start: 2021-06-21 | End: 2022-02-07

## 2021-08-18 ENCOUNTER — OFFICE VISIT (OUTPATIENT)
Dept: CARDIOLOGY | Facility: CLINIC | Age: 66
End: 2021-08-18

## 2021-08-18 VITALS
TEMPERATURE: 98.7 F | HEART RATE: 78 BPM | BODY MASS INDEX: 29.19 KG/M2 | HEIGHT: 67 IN | SYSTOLIC BLOOD PRESSURE: 118 MMHG | DIASTOLIC BLOOD PRESSURE: 78 MMHG | WEIGHT: 186 LBS | OXYGEN SATURATION: 98 %

## 2021-08-18 DIAGNOSIS — E78.2 MIXED HYPERLIPIDEMIA: ICD-10-CM

## 2021-08-18 DIAGNOSIS — I34.0 NON-RHEUMATIC MITRAL REGURGITATION: ICD-10-CM

## 2021-08-18 DIAGNOSIS — I10 ESSENTIAL HYPERTENSION: ICD-10-CM

## 2021-08-18 DIAGNOSIS — I47.1 ATRIAL ECTOPIC TACHYCARDIA (HCC): Primary | ICD-10-CM

## 2021-08-18 PROCEDURE — 99214 OFFICE O/P EST MOD 30 MIN: CPT | Performed by: INTERNAL MEDICINE

## 2021-08-18 NOTE — PROGRESS NOTES
Tarah Hawkins  65 y.o. female    08/18/2021  1. Atrial ectopic tachycardia (CMS/HCC)    2. Essential hypertension    3. Mixed hyperlipidemia    4. Non-rheumatic mitral regurgitation        History of Present Illness  Tarah Hawkins is a 65-year-old female is being seen by me for the first time.  She was a patient of Dr. Oliveira in the past.  Her history is remarkable for hypertension, hyperlipidemia, paroxysmal atrial tachycardia noted on Holter monitor back in 2014, mild to moderate mitral valve regurgitation by echocardiogram.  She has no previous documented coronary artery disease or valvular heart disease.     The patient does report episodes when she feels funny just for a few seconds without dizziness or syncope but feels that her heart stopped or skipped a beat.  She is not able to describe it clearly.  It is not associated with any palpitation, chest pain or shortness of breath.  The patient denies any use of excessive caffeine or over-the-counter medications.  She has been compliant with her medications.  Clinical exam today that showed that her heart rate and blood pressure were in the normal range.  Her past history was reviewed in detail.  Her last echocardiogram was in February 2020 and this showed normal LV systolic function with mild to moderate mitral valve regurgitation.  Her LDL was 106 and HDL 60 when checked in July 2021.  CBC and CMP were within normal limits.    No Known Allergies      Past Medical History:   Diagnosis Date   • Acute bronchiolitis    • Astigmatism    • Diarrhea    • Diverticular disease of colon    • Ectopic atrial tachycardia (CMS/HCC)    • Ganglion cyst of left foot     DORSAL   • Hemorrhoid    • History of colon polyps    • History of seborrheic keratosis     ON NECK   • History of urinary system disease    • Hyperlipidemia    • Hypertension    • Menopausal syndrome    • Need for vaccination    • Presbyopia    • Pseudomembranous enterocolitis     IMPROVED   • Rapid palpitations     • Shoulder pain    • Skin tag     HISTORY OF   • Supraventricular tachycardia (CMS/HCC)    • Verruca vulgaris          Past Surgical History:   Procedure Laterality Date   • CHEST WALL BIOPSY  04/08/2002    Chest lesion removal. Shave biopsy, central chest.   • COLONOSCOPY  03/18/2013    Moderately severe diverticulosis found in the sigmoid colon, descending colon, and ascending colon. Stool was aspirated for study. Hemorrhoids found.   • EXCISION LESION Right 08/26/2002    2 mm punch biopsy, cheek.   • FOOT SURGERY Left 08/22/2000    Removal of ganglion cyst of the dorsal foot.   • INJECTION OF MEDICATION  11/11/2013    KENALOG, X2   • RECTAL SURGERY  10/06/2008    Rectal polyp at 4 cm from the anus posteriorly. Transanal polypectomy.         Family History   Problem Relation Age of Onset   • Hypertension Other    • Stroke Other    • Other Other         COLON PROBLEMS         Social History     Socioeconomic History   • Marital status:      Spouse name: Not on file   • Number of children: Not on file   • Years of education: Not on file   • Highest education level: Not on file   Tobacco Use   • Smoking status: Never Smoker   • Smokeless tobacco: Never Used   Substance and Sexual Activity   • Alcohol use: No   • Drug use: No   • Sexual activity: Defer         Current Outpatient Medications   Medication Sig Dispense Refill   • Biotin 100 MG/GM powder Take 100,000 mcg by mouth Daily.     • cetirizine (zyrTEC) 10 MG tablet Take 10 mg by mouth.     • dilTIAZem CD (Cartia XT) 180 MG 24 hr capsule Take 1 capsule by mouth Daily. 90 capsule 3   • estradiol (MINIVELLE, VIVELLE-DOT) 0.1 MG/24HR patch Place 1 patch on the skin as directed by provider 1 (One) Time Per Week.     • fluticasone (FLONASE) 50 MCG/ACT nasal spray 50 g.     • hydrocortisone (ANUSOL-HC) 25 MG suppository Insert 1 suppository into the rectum 2 (Two) Times a Day As Needed for Hemorrhoids (rectal discomfort). 30 suppository 0   • Multiple  "Vitamins-Minerals (MULTIVITAMIN PO) Take 1 tablet by mouth Daily.     • Omega-3 Fatty Acids (FISH OIL PO) Take  by mouth.     • pravastatin (PRAVACHOL) 80 MG tablet Take 1 tablet by mouth Daily. 90 tablet 1   • FIBER PO Take  by mouth. FIBER       No current facility-administered medications for this visit.         OBJECTIVE    /78 (BP Location: Right arm, Patient Position: Sitting, Cuff Size: Adult)   Pulse 78   Temp 98.7 °F (37.1 °C)   Ht 170.2 cm (67\")   Wt 84.4 kg (186 lb)   SpO2 98%   BMI 29.13 kg/m²         Review of Systems     Constitutional:  Denies recent weight loss, weight gain, fever or chills     HENT:  Denies any hearing loss, epistaxis, hoarseness, or difficulty speaking.     Eyes: Wears eyeglasses or contact lenses     Respiratory:  Denies dyspnea with exertion, no cough, wheezing, or hemoptysis.     Cardiovascular: See HPI    Gastrointestinal:  Denies change in bowel habits, dyspepsia, ulcer disease, hematochezia, or melena.     Endocrine: Negative for cold intolerance, heat intolerance, polydipsia, polyphagia and polyuria.     Genitourinary: Negative.      Musculoskeletal: Denies any history of arthritic symptoms or back problems     Neurological:  Denies any history of recurrent headaches, strokes, TIA, or seizure disorder.     Hematological: Denies any food allergies, seasonal allergies, bleeding disorders, or lymphadenopathy.     Psychiatric/Behavioral: Denies any history of depression, substance abuse, or change in cognitive function.     Physical Exam     Constitutional: Cooperative, alert and oriented, well-developed, well-nourished, in no acute distress.     HENT:   Head: Normocephalic, normal hair patterns, no masses or tenderness.  Ears, Nose, and Throat: No gross abnormalities. No pallor or cyanosis. Dentition good.   Eyes: EOMS intact, PERRL, conjunctivae and lids unremarkable. Fundoscopic exam and visual fields not performed.   Neck: No palpable masses or adenopathy, no " thyromegaly, no JVD, carotid pulses are full and equal bilaterally and without bruit.     Cardiovascular: Regular rhythm, S1 and S2 normal, no S3 or S4.  No murmurs, gallops, or rubs detected.     Pulmonary/Chest: Chest: normal symmetry, normal respiratory excursion, no intercostal retraction, no use of accessory muscles.     Pulmonary: Normal breath sounds. No rales or rhonchi.    Abdominal: Abdomen soft, bowel sounds normoactive, no masses, no hepatosplenomegaly, nontender, no bruit.     Musculoskeletal: No deformities, clubbing, cyanosis, erythema, or edema observed. There are no spinal abnormalities noted. Normal muscle strength and tone. Pulses full and equal in all extremities, no bruit auscultated.     Neurological: No gross motor or sensory deficits noted, affect appropriate, oriented to time, person, place.     Skin: Warm and dry to the touch, no apparent skin lesion or mass noted.     Psychiatric: She has a normal mood and affect. Her behavior is normal. Judgment and thought content normal.         Procedures      Lab Results   Component Value Date    WBC 7 10/03/2019    HGB 13.9 10/03/2019    HCT 44.3 (H) 10/03/2019    MCV 95 10/03/2019     10/03/2019     Lab Results   Component Value Date    GLUCOSE 93 11/30/2017    BUN 13 11/30/2017    CREATININE 0.75 11/30/2017    EGFRIFNONA 78 11/30/2017    BCR 17.3 11/30/2017    CO2 28.0 11/30/2017    CALCIUM 9.5 11/30/2017    ALBUMIN 4.40 11/30/2017    AST 41 (H) 11/30/2017    ALT 36 11/30/2017     Lab Results   Component Value Date    CHOL 198 11/30/2020    CHOL 195 10/03/2019    CHOL 195 08/14/2018     Lab Results   Component Value Date    TRIG 106 11/30/2020    TRIG 132 10/03/2019    TRIG 104 08/14/2018     Lab Results   Component Value Date    HDL 66 11/30/2020    HDL 61 10/03/2019    HDL 65 08/14/2018     No components found for: LDLCALC  Lab Results   Component Value Date     (H) 11/30/2020     (H) 10/03/2019     (H) 08/14/2018      No results found for: HDLLDLRATIO  No components found for: CHOLHDL  No results found for: HGBA1C  Lab Results   Component Value Date    TSH 2.670 11/30/2017    P4HLCXR 8.51 11/30/2017           ASSESSMENT AND PLAN  Tarah Hawkins is a 65-year-old female who has symptomatology as described in the history of present illness with a funny feeling which makes her stop in a track.  She feels that her heart is skipping beats/stopping for a few seconds.  No dizziness or syncope reported.  She has history of mild to moderate mitral valve regurgitation and paroxysmal atrial arrhythmia.  Her blood pressure is in the normal range.  The plan will be to proceed with an echocardiogram to reassess left ventricular and valvular function and an event monitor for 21 days has been arranged to make sure that she does not have any sustained cardiac arrhythmias.  Her LDL is borderline elevated and she has been advised to watch her diet.  No changes in medications have been made and Cardizem CD and pravastatin have been continued.  Further recommendations will follow.    Diagnoses and all orders for this visit:    1. Atrial ectopic tachycardia (CMS/HCC) (Primary)    2. Essential hypertension    3. Mixed hyperlipidemia    4. Non-rheumatic mitral regurgitation        Patient's Body mass index is 29.13 kg/m². indicating that she is overweight (BMI 25-29.9). Obesity-related health conditions include the following: hypertension and dyslipidemias. Obesity is unchanged. BMI is is above average; BMI management plan is completed. We discussed portion control and increasing exercise..      Tarah Hawkins  reports that she has never smoked. She has never used smokeless tobacco..        Idalia Maier MD  8/18/2021  11:04 CDT

## 2021-09-13 ENCOUNTER — TELEPHONE (OUTPATIENT)
Dept: CARDIOLOGY | Facility: CLINIC | Age: 66
End: 2021-09-13

## 2021-09-23 ENCOUNTER — TELEPHONE (OUTPATIENT)
Dept: CARDIOLOGY | Facility: CLINIC | Age: 66
End: 2021-09-23

## 2022-01-19 ENCOUNTER — OFFICE VISIT (OUTPATIENT)
Dept: GASTROENTEROLOGY | Facility: CLINIC | Age: 67
End: 2022-01-19

## 2022-01-19 VITALS
DIASTOLIC BLOOD PRESSURE: 77 MMHG | HEART RATE: 74 BPM | WEIGHT: 185 LBS | BODY MASS INDEX: 29.03 KG/M2 | SYSTOLIC BLOOD PRESSURE: 139 MMHG | HEIGHT: 67 IN

## 2022-01-19 DIAGNOSIS — Z12.11 ENCOUNTER FOR COLONOSCOPY DUE TO HISTORY OF ADENOMATOUS COLONIC POLYPS: Primary | ICD-10-CM

## 2022-01-19 DIAGNOSIS — Z86.010 ENCOUNTER FOR COLONOSCOPY DUE TO HISTORY OF ADENOMATOUS COLONIC POLYPS: Primary | ICD-10-CM

## 2022-01-19 PROBLEM — N81.6 CYSTOCELE WITH RECTOCELE: Status: ACTIVE | Noted: 2019-08-02

## 2022-01-19 PROBLEM — N81.9 FEMALE GENITAL PROLAPSE: Status: ACTIVE | Noted: 2019-08-02

## 2022-01-19 PROBLEM — N81.10 CYSTOCELE WITH RECTOCELE: Status: ACTIVE | Noted: 2019-08-02

## 2022-01-19 PROCEDURE — S0260 H&P FOR SURGERY: HCPCS | Performed by: NURSE PRACTITIONER

## 2022-01-19 RX ORDER — SODIUM, POTASSIUM,MAG SULFATES 17.5-3.13G
1 SOLUTION, RECONSTITUTED, ORAL ORAL EVERY 12 HOURS
Qty: 354 ML | Refills: 0 | Status: ON HOLD | OUTPATIENT
Start: 2022-01-19 | End: 2022-02-08

## 2022-01-19 RX ORDER — SODIUM, POTASSIUM,MAG SULFATES 17.5-3.13G
1 SOLUTION, RECONSTITUTED, ORAL ORAL EVERY 12 HOURS
Qty: 354 ML | Refills: 0 | Status: SHIPPED | OUTPATIENT
Start: 2022-01-19 | End: 2022-01-19 | Stop reason: SDUPTHER

## 2022-01-19 RX ORDER — DEXTROSE AND SODIUM CHLORIDE 5; .45 G/100ML; G/100ML
30 INJECTION, SOLUTION INTRAVENOUS CONTINUOUS PRN
Status: CANCELLED | OUTPATIENT
Start: 2022-02-08

## 2022-01-19 NOTE — PROGRESS NOTES
Chief Complaint   Patient presents with   • Colon Cancer Screening       Subjective    Tarah Hawkins is a 66 y.o. female. she is here today for follow-up.    History of Present Illness  66-year-old female presents for recall letter.  She denies any abdominal pain change in bowel movements or blood in her stool.  States she has  some mild issues with constipation that are typically controlled with fiber and over-the-counter regimen as needed.  Denies any melena or hematochezia.  In 2009 she had large villous adenoma which was removed via wedge resection per Dr. Weir no signs of malignancy were noted.  She has occasional blood in stool described as hemorrhoidal.  Colonoscopy 01/05/2017 noted diverticulosis otherwise normal exam    Plan; schedule patient for colonoscopy due to history of colonic polyps.  Follow-up after test return office sooner if needed       The following portions of the patient's history were reviewed and updated as appropriate:   Past Medical History:   Diagnosis Date   • Acute bronchiolitis    • Astigmatism    • Diarrhea    • Diverticular disease of colon    • Ectopic atrial tachycardia (HCC)    • Ganglion cyst of left foot     DORSAL   • Hemorrhoid    • History of colon polyps    • History of seborrheic keratosis     ON NECK   • History of urinary system disease    • Hyperlipidemia    • Hypertension    • Menopausal syndrome    • Need for vaccination    • Presbyopia    • Pseudomembranous enterocolitis     IMPROVED   • Rapid palpitations    • Shoulder pain    • Skin tag     HISTORY OF   • Supraventricular tachycardia (HCC)    • Verruca vulgaris      Past Surgical History:   Procedure Laterality Date   • CHEST WALL BIOPSY  04/08/2002    Chest lesion removal. Shave biopsy, central chest.   • COLONOSCOPY  03/18/2013    Moderately severe diverticulosis found in the sigmoid colon, descending colon, and ascending colon. Stool was aspirated for study. Hemorrhoids found.   • EXCISION LESION Right  08/26/2002    2 mm punch biopsy, cheek.   • FOOT SURGERY Left 08/22/2000    Removal of ganglion cyst of the dorsal foot.   • INJECTION OF MEDICATION  11/11/2013    KENALOG, X2   • RECTAL SURGERY  10/06/2008    Rectal polyp at 4 cm from the anus posteriorly. Transanal polypectomy.     Family History   Problem Relation Age of Onset   • Hypertension Other    • Stroke Other    • Other Other         COLON PROBLEMS     OB History    No obstetric history on file.       Prior to Admission medications    Medication Sig Start Date End Date Taking? Authorizing Provider   Biotin 100 MG/GM powder Take 100,000 mcg by mouth Daily.   Yes Melissa Morales MD   cetirizine (zyrTEC) 10 MG tablet Take 10 mg by mouth.   Yes Melissa Morales MD   dilTIAZem CD (Cartia XT) 180 MG 24 hr capsule Take 1 capsule by mouth Daily. 6/21/21  Yes Idalia Maier MD   estradiol (MINIVELLE, VIVELLE-DOT) 0.1 MG/24HR patch Place 1 patch on the skin as directed by provider 1 (One) Time Per Week.   Yes Melissa Morales MD   fluticasone (FLONASE) 50 MCG/ACT nasal spray 50 g. 10/19/15  Yes Melissa Morales MD   Multiple Vitamins-Minerals (MULTIVITAMIN PO) Take 1 tablet by mouth Daily.   Yes Melissa Morales MD   Omega-3 Fatty Acids (FISH OIL PO) Take  by mouth.   Yes Melissa Morales MD   pravastatin (PRAVACHOL) 80 MG tablet Take 1 tablet by mouth Daily. 12/4/17  Yes Joe Jones MD   FIBER PO Take  by mouth. FIBER    Melissa Morales MD   hydrocortisone (ANUSOL-HC) 25 MG suppository Insert 1 suppository into the rectum 2 (Two) Times a Day As Needed for Hemorrhoids (rectal discomfort). 12/4/17   Joe Jones MD     No Known Allergies  Social History     Socioeconomic History   • Marital status:    Tobacco Use   • Smoking status: Never Smoker   • Smokeless tobacco: Never Used   Substance and Sexual Activity   • Alcohol use: No   • Drug use: No   • Sexual activity: Defer       Review of  "Systems  Review of Systems   Constitutional: Negative for activity change, appetite change, chills, diaphoresis, fatigue, fever and unexpected weight change.   HENT: Negative for sore throat and trouble swallowing.    Respiratory: Negative for shortness of breath.    Gastrointestinal: Negative for abdominal distention, abdominal pain, anal bleeding, blood in stool, constipation, diarrhea, nausea, rectal pain and vomiting.   Musculoskeletal: Negative for arthralgias.   Skin: Negative for pallor.   Neurological: Negative for light-headedness.        /77 (BP Location: Left arm)   Pulse 74   Ht 170.2 cm (67\")   Wt 83.9 kg (185 lb)   BMI 28.98 kg/m²     Objective    Physical Exam  Constitutional:       General: She is not in acute distress.     Appearance: Normal appearance. She is normal weight. She is not ill-appearing.   HENT:      Head: Normocephalic and atraumatic.   Pulmonary:      Effort: Pulmonary effort is normal.   Abdominal:      General: Bowel sounds are normal. There is no distension.      Palpations: Abdomen is soft. There is no mass.      Tenderness: There is no abdominal tenderness.   Neurological:      Mental Status: She is alert.       Ancillary Procedure on 09/22/2021   Component Date Value Ref Range Status   • BSA 09/22/2021 2.0  m^2 Final   • RVIDd 09/22/2021 3.2  cm Final   • IVSd 09/22/2021 0.9  cm Final   • LVIDd 09/22/2021 4.5  cm Final   • LVIDs 09/22/2021 3.8  cm Final   • LVPWd 09/22/2021 1.0  cm Final   • IVS/LVPW 09/22/2021 0.9   Final   • FS 09/22/2021 15.6  % Final   • EDV(Teich) 09/22/2021 92.4  ml Final   • ESV(Teich) 09/22/2021 62.0  ml Final   • EF(Teich) 09/22/2021 33.0  % Final   • EDV(cubed) 09/22/2021 91.1  ml Final   • ESV(cubed) 09/22/2021 54.9  ml Final   • EF(cubed) 09/22/2021 39.8  % Final   • LV mass(C)d 09/22/2021 142.9  grams Final   • LV mass(C)dI 09/22/2021 72.9  grams/m^2 Final   • SV(Teich) 09/22/2021 30.5  ml Final   • SI(Teich) 09/22/2021 15.6  ml/m^2 Final "   • SV(cubed) 09/22/2021 36.3  ml Final   • SI(cubed) 09/22/2021 18.5  ml/m^2 Final   • EPSS 09/22/2021 0.4  cm Final   • MV Diam 09/22/2021 4.8  cm Final   • Ao root diam 09/22/2021 3.4  cm Final   • Ao root area 09/22/2021 9.1  cm^2 Final   • ACS 09/22/2021 2.1  cm Final   • LA dimension 09/22/2021 3.3  cm Final   • asc Aorta Diam 09/22/2021 3.7  cm Final   • LA/Ao 09/22/2021 0.97   Final   • LVOT diam 09/22/2021 2.1  cm Final   • LVOT area 09/22/2021 3.5  cm^2 Final   • LVOT area(traced) 09/22/2021 3.5  cm^2 Final   • LVLd ap4 09/22/2021 7.4  cm Final   • EDV(MOD-sp4) 09/22/2021 80.0  ml Final   • LVLs ap4 09/22/2021 6.5  cm Final   • ESV(MOD-sp4) 09/22/2021 34.0  ml Final   • EF(MOD-sp4) 09/22/2021 57.5  % Final   • LVLd ap2 09/22/2021 7.3  cm Final   • EDV(MOD-sp2) 09/22/2021 50.0  ml Final   • LVLs ap2 09/22/2021 6.3  cm Final   • ESV(MOD-sp2) 09/22/2021 22.0  ml Final   • EF(MOD-sp2) 09/22/2021 56.0  % Final   • SV(MOD-sp4) 09/22/2021 46.0  ml Final   • SI(MOD-sp4) 09/22/2021 23.5  ml/m^2 Final   • SV(MOD-sp2) 09/22/2021 28.0  ml Final   • SI(MOD-sp2) 09/22/2021 14.3  ml/m^2 Final   • Ao root area (BSA corrected) 09/22/2021 1.7   Final   • LV Rust Vol (BSA corrected) 09/22/2021 40.8  ml/m^2 Final   • LV Sys Vol (BSA corrected) 09/22/2021 17.3  ml/m^2 Final   • MV E max kemar 09/22/2021 53.8  cm/sec Final   • MV A max kemar 09/22/2021 73.1  cm/sec Final   • MV E/A 09/22/2021 0.74   Final   • MV V2 max 09/22/2021 91.8  cm/sec Final   • MV max PG 09/22/2021 3.4  mmHg Final   • MV V2 mean 09/22/2021 46.1  cm/sec Final   • MV mean PG 09/22/2021 1.0  mmHg Final   • MV V2 VTI 09/22/2021 26.6  cm Final   • MV area (1 diam) 09/22/2021 18.1  cm^2 Final   • MVA(VTI) 09/22/2021 3.0  cm^2 Final   • MV Flow area(1diam) 09/22/2021 18.1  cm^2 Final   • MV P1/2t max kemar 09/22/2021 73.4  cm/sec Final   • MV P1/2t 09/22/2021 133.5  msec Final   • MVA(P1/2t) 09/22/2021 1.6  cm^2 Final   • MV dec slope 09/22/2021 161.0  cm/sec^2  Final   • Ao pk kemar 09/22/2021 118.0  cm/sec Final   • Ao max PG 09/22/2021 5.6  mmHg Final   • Ao max PG (full) 09/22/2021 1.7  mmHg Final   • Ao V2 mean 09/22/2021 81.5  cm/sec Final   • Ao mean PG 09/22/2021 3.0  mmHg Final   • Ao mean PG (full) 09/22/2021 1.0  mmHg Final   • Ao V2 VTI 09/22/2021 23.7  cm Final   • PALLAVI(I,A) 09/22/2021 3.3  cm^2 Final   • PALLAVI(I,D) 09/22/2021 3.3  cm^2 Final   • PALLAVI(V,A) 09/22/2021 2.9  cm^2 Final   • PALLAVI(V,D) 09/22/2021 2.9  cm^2 Final   • LV V1 max PG 09/22/2021 3.9  mmHg Final   • LV V1 mean PG 09/22/2021 2.0  mmHg Final   • LV V1 max 09/22/2021 98.5  cm/sec Final   • LV V1 mean 09/22/2021 62.1  cm/sec Final   • LV V1 VTI 09/22/2021 22.7  cm Final   • MR max kemar 09/22/2021 456.0  cm/sec Final   • MR max PG 09/22/2021 83.2  mmHg Final   • MR PISA 09/22/2021 1.6  cm^2 Final   • MR flow rate 09/22/2021 60.5  cm^3/sec Final   • MR ERO 09/22/2021 0.13  cm^2 Final   • MR PISA radius 09/22/2021 0.5  cm Final   • MR alias kemar 09/22/2021 38.5  cm/sec Final   • SV(MV 1 diam) 09/22/2021 481.3  ml Final   • SI(MV 1 diam) 09/22/2021 245.5  ml/m^2 Final   • SV(Ao) 09/22/2021 215.2  ml Final   • SI(Ao) 09/22/2021 109.7  ml/m^2 Final   • SV(LVOT) 09/22/2021 78.6  ml Final   • SI(LVOT) 09/22/2021 40.1  ml/m^2 Final   • PA V2 max 09/22/2021 79.5  cm/sec Final   • PA max PG 09/22/2021 2.5  mmHg Final   • PA max PG (full) 09/22/2021 0.17  mmHg Final   • PA V2 mean 09/22/2021 60.9  cm/sec Final   • PA mean PG 09/22/2021 2.0  mmHg Final   • PA mean PG (full) 09/22/2021 1.0  mmHg Final   • PA V2 VTI 09/22/2021 17.7  cm Final   • RV V1 max PG 09/22/2021 2.4  mmHg Final   • RV V1 mean PG 09/22/2021 1.0  mmHg Final   • RV V1 max 09/22/2021 76.7  cm/sec Final   • RV V1 mean 09/22/2021 51.2  cm/sec Final   • RV V1 VTI 09/22/2021 17.2  cm Final   • TR max kemar 09/22/2021 235.0  cm/sec Final   • RVSP(TR) 09/22/2021 27.1  mmHg Final   • RAP systole 09/22/2021 5.0  mmHg Final   • BH CV ECHO KEVON - RF(MV,AO)(1  DIAM) 09/22/2021 0.55   Final   • RF(MV,LVOT)(1diam) 09/22/2021 0.84   Final   • MVA P1/2T LCG 09/22/2021 3.0  cm^2 Final   • BH CV ECHO KEVON - BZI_BMI 09/22/2021 29.1  kilograms/m^2 Final   • BH CV ECHO KEVON - BSA(HAYCOCK) 09/22/2021 2.0  m^2 Final   • BH CV ECHO KEVON - BZI_METRIC_WEIGHT 09/22/2021 84.4  kg Final   • BH CV ECHO KEVON - BZI_METRIC_HEIGHT 09/22/2021 170.2  cm Final   • Target HR (85%) 09/22/2021 131  bpm Final   • Max. Pred. HR (100%) 09/22/2021 154  bpm Final   • BH CV VAS BP RIGHT ARM 09/22/2021 130/80  mmHg Final   • Echo EF Estimated 09/22/2021 63  % Final     Assessment/Plan      1. Encounter for colonoscopy due to history of adenomatous colonic polyps    .       Orders placed during this encounter include:  Orders Placed This Encounter   Procedures   • Follow Anesthesia Guidelines / Standing Orders     Standing Status:   Future   • Obtain Informed Consent     Standing Status:   Future     Order Specific Question:   Informed Consent Given For     Answer:   COLONOSCOPY       COLONOSCOPY (N/A)    Review and/or summary of lab tests, radiology, procedures, medications. Review and summary of old records and obtaining of history. The risks and benefits of my recommendations, as well as other treatment options were discussed with the patient today. Questions were answered.    No orders of the defined types were placed in this encounter.      Follow-up: Return in about 4 weeks (around 2/16/2022) for Recheck, After test.          This document has been electronically signed by ELY Watson on January 19, 2022 12:17 CST           I spent 10 minutes caring for Tarah on this date of service. This time includes time spent by me in the following activities:preparing for the visit, reviewing tests, obtaining and/or reviewing a separately obtained history, performing a medically appropriate examination and/or evaluation , counseling and educating the patient/family/caregiver, ordering medications, tests, or  procedures, referring and communicating with other health care professionals , documenting information in the medical record and care coordination    Results for orders placed or performed in visit on 09/22/21   Adult Transthoracic Echo Complete w/ Color, Spectral and Contrast if Necessary Per Protocol   Result Value Ref Range    BSA 2.0 m^2    RVIDd 3.2 cm    IVSd 0.9 cm    LVIDd 4.5 cm    LVIDs 3.8 cm    LVPWd 1.0 cm    IVS/LVPW 0.9     FS 15.6 %    EDV(Teich) 92.4 ml    ESV(Teich) 62.0 ml    EF(Teich) 33.0 %    EDV(cubed) 91.1 ml    ESV(cubed) 54.9 ml    EF(cubed) 39.8 %    LV mass(C)d 142.9 grams    LV mass(C)dI 72.9 grams/m^2    SV(Teich) 30.5 ml    SI(Teich) 15.6 ml/m^2    SV(cubed) 36.3 ml    SI(cubed) 18.5 ml/m^2    EPSS 0.4 cm    MV Diam 4.8 cm    Ao root diam 3.4 cm    Ao root area 9.1 cm^2    ACS 2.1 cm    LA dimension 3.3 cm    asc Aorta Diam 3.7 cm    LA/Ao 0.97     LVOT diam 2.1 cm    LVOT area 3.5 cm^2    LVOT area(traced) 3.5 cm^2    LVLd ap4 7.4 cm    EDV(MOD-sp4) 80.0 ml    LVLs ap4 6.5 cm    ESV(MOD-sp4) 34.0 ml    EF(MOD-sp4) 57.5 %    LVLd ap2 7.3 cm    EDV(MOD-sp2) 50.0 ml    LVLs ap2 6.3 cm    ESV(MOD-sp2) 22.0 ml    EF(MOD-sp2) 56.0 %    SV(MOD-sp4) 46.0 ml    SI(MOD-sp4) 23.5 ml/m^2    SV(MOD-sp2) 28.0 ml    SI(MOD-sp2) 14.3 ml/m^2    Ao root area (BSA corrected) 1.7     LV Rust Vol (BSA corrected) 40.8 ml/m^2    LV Sys Vol (BSA corrected) 17.3 ml/m^2    MV E max kemar 53.8 cm/sec    MV A max kemar 73.1 cm/sec    MV E/A 0.74     MV V2 max 91.8 cm/sec    MV max PG 3.4 mmHg    MV V2 mean 46.1 cm/sec    MV mean PG 1.0 mmHg    MV V2 VTI 26.6 cm    MV area (1 diam) 18.1 cm^2    MVA(VTI) 3.0 cm^2    MV Flow area(1diam) 18.1 cm^2    MV P1/2t max kemar 73.4 cm/sec    MV P1/2t 133.5 msec    MVA(P1/2t) 1.6 cm^2    MV dec slope 161.0 cm/sec^2    Ao pk kemar 118.0 cm/sec    Ao max PG 5.6 mmHg    Ao max PG (full) 1.7 mmHg    Ao V2 mean 81.5 cm/sec    Ao mean PG 3.0 mmHg    Ao mean PG (full) 1.0 mmHg    Ao V2 VTI  23.7 cm    PALLAVI(I,A) 3.3 cm^2    PALLAVI(I,D) 3.3 cm^2    PALLAVI(V,A) 2.9 cm^2    PALLAVI(V,D) 2.9 cm^2    LV V1 max PG 3.9 mmHg    LV V1 mean PG 2.0 mmHg    LV V1 max 98.5 cm/sec    LV V1 mean 62.1 cm/sec    LV V1 VTI 22.7 cm    MR max kemar 456.0 cm/sec    MR max PG 83.2 mmHg    MR PISA 1.6 cm^2    MR flow rate 60.5 cm^3/sec    MR ERO 0.13 cm^2    MR PISA radius 0.5 cm    MR alias kemar 38.5 cm/sec    SV(MV 1 diam) 481.3 ml    SI(MV 1 diam) 245.5 ml/m^2    SV(Ao) 215.2 ml    SI(Ao) 109.7 ml/m^2    SV(LVOT) 78.6 ml    SI(LVOT) 40.1 ml/m^2    PA V2 max 79.5 cm/sec    PA max PG 2.5 mmHg    PA max PG (full) 0.17 mmHg    PA V2 mean 60.9 cm/sec    PA mean PG 2.0 mmHg    PA mean PG (full) 1.0 mmHg    PA V2 VTI 17.7 cm    RV V1 max PG 2.4 mmHg    RV V1 mean PG 1.0 mmHg    RV V1 max 76.7 cm/sec    RV V1 mean 51.2 cm/sec    RV V1 VTI 17.2 cm    TR max kemar 235.0 cm/sec    RVSP(TR) 27.1 mmHg    RAP systole 5.0 mmHg     CV ECHO KEVON - RF(MV,AO)(1 DIAM) 0.55     RF(MV,LVOT)(1diam) 0.84     MVA P1/2T LCG 3.0 cm^2     CV ECHO KEVON - BZI_BMI 29.1 kilograms/m^2     CV ECHO KEVON - BSA(HAYCOCK) 2.0 m^2     CV ECHO KEVON - BZI_METRIC_WEIGHT 84.4 kg     CV ECHO KEVON - BZI_METRIC_HEIGHT 170.2 cm    Target HR (85%) 131 bpm    Max. Pred. HR (100%) 154 bpm     CV VAS BP RIGHT /80 mmHg    Echo EF Estimated 63 %     *Note: Due to a large number of results and/or encounters for the requested time period, some results have not been displayed. A complete set of results can be found in Results Review.

## 2022-01-19 NOTE — PATIENT INSTRUCTIONS
"https://www.cancer.org/cancer/colon-rectal-cancer/causes-risks-prevention/risk-factors.html\">   Colorectal Cancer Screening    Colorectal cancer screening is a group of tests that are used to check for colorectal cancer before symptoms develop. Colorectal refers to the colon and rectum. The colon and rectum are located at the end of the digestive tract and carry stool (feces) out of the body.  Who should have screening?  All adults who are 45-75 years old should have screening. Your health care provider may recommend screening before age 45. You will have tests every 1-10 years, depending on your results and the type of screening test. Screening recommendations for adults who are 76-85 years old vary depending on a person's health. People older than age 85 should no longer get colorectal cancer screening.  You may have screening tests starting before age 45, or more often than other people, if you have any of these risk factors:  · A personal or family history of colorectal cancer or abnormal growths known as polyps in your colon.  · Inflammatory bowel disease, such as ulcerative colitis or Crohn's disease.  · A history of having radiation treatment to the abdomen or the area between the hip bones (pelvic area) for cancer.  · A type of genetic syndrome that is passed from parent to child (hereditary), such as:  ? Pelletier syndrome.  ? Familial adenomatous polyposis.  ? Turcot syndrome.  ? Peutz-Jeghers syndrome.  ? MUTYH-associated polyposis (MAP).  · A personal history of diabetes.  Types of tests  There are several types of colorectal screening tests. You may have one or more of the following:  · Guaiac-based fecal occult blood testing. For this test, a stool sample is checked for hidden (occult) blood, which could be a sign of colorectal cancer.  · Fecal immunochemical test (FIT). For this test, a stool sample is checked for blood, which could be a sign of colorectal cancer.  · Stool DNA test. For this test, a stool " sample is checked for blood and changes in DNA that could lead to colorectal cancer.  · Sigmoidoscopy. During this test, a thin, flexible tube with a camera on the end, called a sigmoidoscope, is used to examine the rectum and the lower colon.  · Colonoscopy. During this test, a long, flexible tube with a camera on the end, called a colonoscope, is used to examine the entire colon and rectum. Also, sometimes a tissue sample is taken to be looked at under a microscope (biopsy) or small polyps are removed during this test.  · Virtual colonoscopy. Instead of a colonoscope, this type of colonoscopy uses a CT scan to take pictures of the colon and rectum. A CT scan is a type of X-ray that is made using computers.  What are the benefits of screening?  Screening reduces your risk for colorectal cancer and can help identify cancer at an early stage, when the cancer can be removed or treated more easily. It is common for polyps to form in the lining of the colon, especially as you age. These polyps may be cancerous or become cancerous over time. Screening can identify these polyps.  What are the risks of screening?  Generally, these are safe tests. However, problems may occur, including:  · The need for more tests to confirm results from a stool sample test. Stool sample tests have fewer risks than other types of screening tests.  · Being exposed to low levels of radiation, if you had a test involving X-rays. This may slightly increase your cancer risk. The benefit of detecting cancer outweighs the slight increase in risk.  · Bleeding, damage to the intestine, or infection caused by a sigmoidoscopy or colonoscopy.  · A reaction to medicines given during a sigmoidoscopy or colonoscopy.  Talk with your health care provider to understand your risk for colorectal cancer and to make a screening plan that is right for you.  Questions to ask your health care provider  · When should I start colorectal cancer screening?  · What is my  risk for colorectal cancer?  · How often do I need screening?  · Which screening tests do I need?  · How do I get my test results?  · What do my results mean?  Where to find more information  Learn more about colorectal cancer screening from:  · The American Cancer Society: cancer.org  · National Cancer [place]: cancer.gov  Summary  · Colorectal cancer screening is a group of tests used to check for colorectal cancer before symptoms develop.  · All adults who are 45-75 years old should have screening. Your health care provider may recommend screening before age 45.  · You may have screening tests starting before age 45, or more often than other people, if you have certain risk factors.  · Screening reduces your risk for colorectal cancer and can help identify cancer at an early stage, when the cancer can be removed or treated more easily.  · Talk with your health care provider to understand your risk for colorectal cancer and to make a screening plan that is right for you.  This information is not intended to replace advice given to you by your health care provider. Make sure you discuss any questions you have with your health care provider.  Document Revised: 04/07/2021 Document Reviewed: 04/07/2021  Elsevier Patient Education © 2021 [employer]

## 2022-01-24 ENCOUNTER — TELEPHONE (OUTPATIENT)
Dept: GASTROENTEROLOGY | Facility: CLINIC | Age: 67
End: 2022-01-24

## 2022-01-24 NOTE — TELEPHONE ENCOUNTER
01/24/2022, 1112 - Patient telephoned stating Suprep Bowel Preparation is $100.00 and requested a more cost effective bowel preparation.  Patient denied history of Kidney Disease.  Patient made aware she may utilize X 2 bottles of Magnesia Citrate.  Patient stated e-mail address [email or web address].  Patient made aware instructions for utilization of Magnesia Citrate will be submitted to her via e-mail.  Patient instructed to contact office if instructions not received within one hour.  Patient verbalized understanding.

## 2022-02-07 RX ORDER — DILTIAZEM HYDROCHLORIDE 180 MG/1
180 CAPSULE, COATED, EXTENDED RELEASE ORAL NIGHTLY
COMMUNITY
End: 2022-02-18 | Stop reason: SDUPTHER

## 2022-02-07 RX ORDER — PRAVASTATIN SODIUM 80 MG/1
80 TABLET ORAL NIGHTLY
COMMUNITY

## 2022-02-08 ENCOUNTER — HOSPITAL ENCOUNTER (OUTPATIENT)
Facility: HOSPITAL | Age: 67
Setting detail: HOSPITAL OUTPATIENT SURGERY
Discharge: HOME | End: 2022-02-08
Attending: INTERNAL MEDICINE | Admitting: INTERNAL MEDICINE

## 2022-02-08 ENCOUNTER — ANESTHESIA EVENT (OUTPATIENT)
Dept: GASTROENTEROLOGY | Facility: HOSPITAL | Age: 67
End: 2022-02-08

## 2022-02-08 ENCOUNTER — ANESTHESIA (OUTPATIENT)
Dept: GASTROENTEROLOGY | Facility: HOSPITAL | Age: 67
End: 2022-02-08

## 2022-02-08 VITALS
HEIGHT: 67 IN | RESPIRATION RATE: 18 BRPM | WEIGHT: 180 LBS | BODY MASS INDEX: 28.25 KG/M2 | OXYGEN SATURATION: 100 % | DIASTOLIC BLOOD PRESSURE: 58 MMHG | SYSTOLIC BLOOD PRESSURE: 111 MMHG | TEMPERATURE: 97.7 F | HEART RATE: 69 BPM

## 2022-02-08 DIAGNOSIS — Z86.010 ENCOUNTER FOR COLONOSCOPY DUE TO HISTORY OF ADENOMATOUS COLONIC POLYPS: ICD-10-CM

## 2022-02-08 DIAGNOSIS — Z12.11 ENCOUNTER FOR COLONOSCOPY DUE TO HISTORY OF ADENOMATOUS COLONIC POLYPS: ICD-10-CM

## 2022-02-08 PROCEDURE — 25010000002 PROPOFOL 10 MG/ML EMULSION

## 2022-02-08 PROCEDURE — G0105 COLORECTAL SCRN; HI RISK IND: HCPCS | Performed by: INTERNAL MEDICINE

## 2022-02-08 RX ORDER — LIDOCAINE HYDROCHLORIDE 20 MG/ML
INJECTION, SOLUTION EPIDURAL; INFILTRATION; INTRACAUDAL; PERINEURAL AS NEEDED
Status: DISCONTINUED | OUTPATIENT
Start: 2022-02-08 | End: 2022-02-08 | Stop reason: SURG

## 2022-02-08 RX ORDER — PROPOFOL 10 MG/ML
VIAL (ML) INTRAVENOUS AS NEEDED
Status: DISCONTINUED | OUTPATIENT
Start: 2022-02-08 | End: 2022-02-08 | Stop reason: SURG

## 2022-02-08 RX ORDER — DEXTROSE AND SODIUM CHLORIDE 5; .45 G/100ML; G/100ML
30 INJECTION, SOLUTION INTRAVENOUS CONTINUOUS PRN
Status: DISCONTINUED | OUTPATIENT
Start: 2022-02-08 | End: 2022-02-08 | Stop reason: HOSPADM

## 2022-02-08 RX ADMIN — PROPOFOL 20 MG: 10 INJECTION, EMULSION INTRAVENOUS at 10:23

## 2022-02-08 RX ADMIN — PROPOFOL 30 MG: 10 INJECTION, EMULSION INTRAVENOUS at 10:20

## 2022-02-08 RX ADMIN — DEXTROSE AND SODIUM CHLORIDE 30 ML/HR: 5; 450 INJECTION, SOLUTION INTRAVENOUS at 09:42

## 2022-02-08 RX ADMIN — PROPOFOL 20 MG: 10 INJECTION, EMULSION INTRAVENOUS at 10:27

## 2022-02-08 RX ADMIN — LIDOCAINE HYDROCHLORIDE 50 MG: 20 INJECTION, SOLUTION EPIDURAL; INFILTRATION; INTRACAUDAL; PERINEURAL at 10:19

## 2022-02-08 RX ADMIN — PROPOFOL 70 MG: 10 INJECTION, EMULSION INTRAVENOUS at 10:19

## 2022-02-08 NOTE — ANESTHESIA PREPROCEDURE EVALUATION
Anesthesia Evaluation     Patient summary reviewed and Nursing notes reviewed   NPO Solid Status: > 8 hours  NPO Liquid Status: > 2 hours           Airway   Mallampati: II  TM distance: >3 FB  Neck ROM: full  No difficulty expected  Dental - normal exam   (+) poor dentition    Pulmonary - negative pulmonary ROS and normal exam   Cardiovascular - normal exam    (+) hypertension, dysrhythmias (svt, atrial tachycardia), hyperlipidemia,       Neuro/Psych- negative ROS  GI/Hepatic/Renal/Endo - negative ROS     Musculoskeletal     Abdominal  - normal exam    Bowel sounds: normal.   Substance History - negative use     OB/GYN negative ob/gyn ROS         Other   arthritis,                      Anesthesia Plan    ASA 2     MAC     intravenous induction     Anesthetic plan, all risks, benefits, and alternatives have been provided, discussed and informed consent has been obtained with: patient.    Plan discussed with CRNA.        CODE STATUS:

## 2022-02-08 NOTE — ANESTHESIA POSTPROCEDURE EVALUATION
Patient: Tarah Hawkins    Procedure Summary     Date: 02/08/22 Room / Location: [facility] ENDOSCOPY 1 / [facility] ENDOSCOPY    Anesthesia Start: 1018 Anesthesia Stop: 1032    Procedure: COLONOSCOPY (N/A ) Diagnosis:       Encounter for colonoscopy due to history of adenomatous colonic polyps      (Encounter for colonoscopy due to history of adenomatous colonic polyps [Z12.11, Z86.010])    Surgeons: Reji Oshea MD Provider: Kip Beach CRNA    Anesthesia Type: MAC ASA Status: 2          Anesthesia Type: MAC    Vitals  No vitals data found for the desired time range.          Post Anesthesia Care and Evaluation    Patient location during evaluation: PHASE II  Patient participation: complete - patient participated  Level of consciousness: awake  Pain management: adequate  Airway patency: patent  Anesthetic complications: No anesthetic complications  PONV Status: none  Cardiovascular status: acceptable  Respiratory status: acceptable  Hydration status: acceptable    Comments:

## 2022-02-18 ENCOUNTER — OFFICE VISIT (OUTPATIENT)
Dept: CARDIOLOGY | Facility: CLINIC | Age: 67
End: 2022-02-18

## 2022-02-18 VITALS
WEIGHT: 182 LBS | DIASTOLIC BLOOD PRESSURE: 70 MMHG | SYSTOLIC BLOOD PRESSURE: 122 MMHG | TEMPERATURE: 97.1 F | HEIGHT: 67 IN | OXYGEN SATURATION: 96 % | BODY MASS INDEX: 28.56 KG/M2 | HEART RATE: 75 BPM

## 2022-02-18 DIAGNOSIS — E78.2 MIXED HYPERLIPIDEMIA: ICD-10-CM

## 2022-02-18 DIAGNOSIS — I10 ESSENTIAL HYPERTENSION: ICD-10-CM

## 2022-02-18 DIAGNOSIS — I47.1 ATRIAL ECTOPIC TACHYCARDIA: Primary | ICD-10-CM

## 2022-02-18 PROCEDURE — 99213 OFFICE O/P EST LOW 20 MIN: CPT | Performed by: INTERNAL MEDICINE

## 2022-02-18 RX ORDER — DILTIAZEM HYDROCHLORIDE 180 MG/1
180 CAPSULE, COATED, EXTENDED RELEASE ORAL NIGHTLY
Qty: 90 CAPSULE | Refills: 3 | Status: SHIPPED | OUTPATIENT
Start: 2022-02-18

## 2022-02-18 NOTE — PROGRESS NOTES
Tarah Hawkins  66 y.o. female    1. Atrial ectopic tachycardia (HCC)    2. Essential hypertension    3. Mixed hyperlipidemia        History of Present Illness  Tarah Hawkins is a 66-year-old female with hypertension, hyperlipidemia, paroxysmal atrial tachycardia noted on Holter monitor back in 2014, mild to moderate mitral valve regurgitation by echocardiogram.  She has no previous documented coronary artery disease or valvular heart disease.     On her previous visit, she did indicate fluttering the chest and to further evaluate this the following tests were arranged.  Event monitor performed in September 2021 showed:  Impression: Event monitoring showing essentially sinus rhythm with rare PACs and PVCs with no sustained arrhythmias    Echocardiogram in September 2021 showed:  · The study is technically difficult for diagnosis.  · Estimated left ventricular EF = 63% Left ventricular ejection fraction appears to be 61 - 65%. Left ventricular systolic function is normal.  · Left ventricular diastolic function is consistent with (grade I) impaired relaxation.  · The right ventricular cavity is borderline dilated.  · Mild mitral valve regurgitation is present.  · Estimated right ventricular systolic pressure from tricuspid regurgitation is normal (<35 mmHg).     She denied any cardiac symptoms at the present time and has been compliant with the medication.  Clinical exam was unremarkable.      Allergies   Allergen Reactions   • Other Other (See Comments)     Hx of c diff approx 5-6 years ago           Past Medical History:   Diagnosis Date   • Acute bronchiolitis    • Arthritis    • Astigmatism    • Diarrhea    • Diverticular disease of colon    • Ectopic atrial tachycardia (HCC)    • Ganglion cyst of left foot     DORSAL   • Hemorrhoid    • History of colon polyps    • History of seborrheic keratosis     ON NECK   • History of urinary system disease    • Hyperlipidemia    • Hypertension    • Menopausal syndrome    •  Need for vaccination    • Presbyopia    • Pseudomembranous enterocolitis     IMPROVED   • Rapid palpitations    • Shoulder pain    • Skin tag     HISTORY OF   • Supraventricular tachycardia (HCC)    • Verruca vulgaris    • Wears glasses          Past Surgical History:   Procedure Laterality Date   • CHEST WALL BIOPSY  04/08/2002    Chest lesion removal. Shave biopsy, central chest.   • COLONOSCOPY  03/18/2013    Moderately severe diverticulosis found in the sigmoid colon, descending colon, and ascending colon. Stool was aspirated for study. Hemorrhoids found.   • COLONOSCOPY N/A 2/8/2022    Procedure: COLONOSCOPY;  Surgeon: Reji Oshea MD;  Location: [facility]COPY;  Service: Gastroenterology;  Laterality: N/A;   • EXCISION LESION Right 08/26/2002    2 mm punch biopsy, cheek.   • FOOT SURGERY Left 08/22/2000    Removal of ganglion cyst of the dorsal foot.   • INJECTION OF MEDICATION  11/11/2013    KENALOG, X2   • RECTAL SURGERY  10/06/2008    Rectal polyp at 4 cm from the anus posteriorly. Transanal polypectomy.         Family History   Problem Relation Age of Onset   • Hypertension Other    • Stroke Other    • Other Other         COLON PROBLEMS         Social History     Socioeconomic History   • Marital status:    Tobacco Use   • Smoking status: Never Smoker   • Smokeless tobacco: Never Used   Vaping Use   • Vaping Use: Never used   Substance and Sexual Activity   • Alcohol use: No   • Drug use: No   • Sexual activity: Defer         Current Outpatient Medications   Medication Sig Dispense Refill   • Biotin 100 MG/GM powder Take 100,000 mcg by mouth Daily.     • cetirizine (zyrTEC) 10 MG tablet Take 10 mg by mouth Daily.     • dilTIAZem CD (Cartia XT) 180 MG 24 hr capsule Take 1 capsule by mouth Every Night. 90 capsule 3   • estradiol (MINIVELLE, VIVELLE-DOT) 0.1 MG/24HR patch Place 1 patch on the skin as directed by provider 1 (One) Time Per Week.     • FIBER PO Take  by mouth. FIBER     •  "fluticasone (FLONASE) 50 MCG/ACT nasal spray 1 spray into the nostril(s) as directed by provider Daily.     • Multiple Vitamins-Minerals (MULTIVITAMIN PO) Take 1 tablet by mouth Daily.     • Omega-3 Fatty Acids (FISH OIL PO) Take 500 mg by mouth Daily. creole     • pravastatin (PRAVACHOL) 80 MG tablet Take 80 mg by mouth Every Night.       No current facility-administered medications for this visit.         OBJECTIVE    /70 (BP Location: Left arm, Patient Position: Sitting, Cuff Size: Adult)   Pulse 75   Temp 97.1 °F (36.2 °C)   Ht 170.2 cm (67\")   Wt 82.6 kg (182 lb)   SpO2 96%   BMI 28.51 kg/m²         Review of Systems: The following systems were reviewed and changes noted as indicated in the history of present illness    Constitutional:  Denies recent weight loss, weight gain, fever or chills     HENT:  Denies any hearing loss, epistaxis, hoarseness, or difficulty speaking.     Eyes: Wears eyeglasses or contact lenses     Respiratory:  Denies dyspnea with exertion, no cough, wheezing, or hemoptysis.     Cardiovascular: No chest pain or palpitation.    Gastrointestinal:  Denies change in bowel habits, dyspepsia, ulcer disease, hematochezia, or melena.     Endocrine: Negative for cold intolerance, heat intolerance, polydipsia, polyphagia and polyuria.     Genitourinary: Negative.      Musculoskeletal: Denies any history of arthritic symptoms or back problems     Neurological:  Denies any history of recurrent headaches, strokes, TIA, or seizure disorder.     Hematological: Denies any food allergies, seasonal allergies, bleeding disorders, or lymphadenopathy.     Psychiatric/Behavioral: Denies any history of depression, substance abuse, or change in cognitive function.     Physical Exam: The following systems are reviewed and no changes noted    Constitutional: Cooperative, alert and oriented, well-developed, well-nourished, in no acute distress.     HENT:   Head: Normocephalic, normal hair patterns, no " masses or tenderness.  Ears, Nose, and Throat: No gross abnormalities. No pallor or cyanosis. Dentition good.   Eyes: EOMS intact, PERRL, conjunctivae and lids unremarkable. Fundoscopic exam and visual fields not performed.   Neck: No palpable masses or adenopathy, no thyromegaly, no JVD, carotid pulses are full and equal bilaterally and without bruit.     Cardiovascular: Regular rhythm, S1 and S2 normal, no S3 or S4.  No murmurs, gallops, or rubs detected.     Pulmonary/Chest: Chest: normal symmetry, normal respiratory excursion, no intercostal retraction, no use of accessory muscles.     Pulmonary: Normal breath sounds. No rales or rhonchi.    Abdominal: Abdomen soft, bowel sounds normoactive, no masses, no hepatosplenomegaly, nontender, no bruit.     Musculoskeletal: No deformities, clubbing, cyanosis, erythema, or edema observed. There are no spinal abnormalities noted. Normal muscle strength and tone. Pulses full and equal in all extremities, no bruit auscultated.     Neurological: No gross motor or sensory deficits noted, affect appropriate, oriented to time, person, place.     Skin: Warm and dry to the touch, no apparent skin lesion or mass noted.     Psychiatric: She has a normal mood and affect. Her behavior is normal. Judgment and thought content normal.         Procedures      Lab Results   Component Value Date    WBC 7 10/03/2019    HGB 13.9 10/03/2019    HCT 44.3 (H) 10/03/2019    MCV 95 10/03/2019     10/03/2019     Lab Results   Component Value Date    GLUCOSE 93 11/30/2017    BUN 15 07/08/2021    CREATININE 0.8 07/08/2021    EGFRIFNONA 78 11/30/2017    EGFRIFAFRI 87 07/08/2021    BCR 17.3 11/30/2017    CO2 20 (L) 07/08/2021    CALCIUM 9.4 07/08/2021    ALBUMIN 4.5 07/08/2021    AST 21 07/08/2021    ALT 17 07/08/2021     Lab Results   Component Value Date    CHOL 198 11/30/2020    CHOL 195 10/03/2019    CHOL 195 08/14/2018     Lab Results   Component Value Date    TRIG 91 07/08/2021    TRIG 106  11/30/2020    TRIG 132 10/03/2019     Lab Results   Component Value Date    HDL 60 07/08/2021    HDL 66 11/30/2020    HDL 61 10/03/2019     No components found for: LDLCALC  Lab Results   Component Value Date     07/08/2021     (H) 11/30/2020     (H) 10/03/2019     No results found for: HDLLDLRATIO  No components found for: CHOLHDL  No results found for: HGBA1C  Lab Results   Component Value Date    TSH 2.670 11/30/2017    E2XJTKY 8.51 11/30/2017           ASSESSMENT AND PLAN  Tarah Hawkins is a 66-year-old female who does not have any cardiac symptoms at the present time.  She has not had any recurrence of significant cardiac arrhythmias.    No changes in medications have been made and Cardizem CD and pravastatin have been continued.  She will continue to follow-up with Dr. Urbina and will be glad to see her on a yearly basis.  Diltiazem CD was refilled.      Diagnoses and all orders for this visit:    1. Atrial ectopic tachycardia (HCC) (Primary)    2. Essential hypertension    3. Mixed hyperlipidemia    Other orders  -     dilTIAZem CD (Cartia XT) 180 MG 24 hr capsule; Take 1 capsule by mouth Every Night.  Dispense: 90 capsule; Refill: 3        Patient's Body mass index is 28.51 kg/m². indicating that she is overweight (BMI 25-29.9). Obesity-related health conditions include the following: hypertension and dyslipidemias. Obesity is unchanged. BMI is is above average; BMI management plan is completed. We discussed portion control and increasing exercise..      Tarah Hawkins  reports that she has never smoked. She has never used smokeless tobacco..        Idalia Maier MD  2/18/2022  17:37 CST

## 2022-02-22 NOTE — PATIENT INSTRUCTIONS
Medication:   Requested Prescriptions     Pending Prescriptions Disp Refills    amphetamine-dextroamphetamine (ADDERALL, 20MG,) 20 MG tablet 60 tablet 0     Sig: Take 1 tablet by mouth 2 times daily for 30 days.      Last Filled: 1/25/22  Last appt: 12/3/2021   Next appt: 3/11/2022    Last Lipid:   Lab Results   Component Value Date    CHOL 196 03/01/2019    TRIG 65 03/01/2019    HDL 81 03/01/2019    LDLCALC 102 03/01/2019 Mitral Valve Regurgitation  Mitral valve regurgitation, also called mitral regurgitation, is a condition in which blood leaks from the mitral valve in the heart. The mitral valve is located between the upper left chamber (left atrium) and the lower left chamber (left ventricle) of the heart. Normally, this valve opens when the atrium pumps blood into the ventricle, and it closes when the ventricle pumps blood out to the body.  Mitral valve regurgitation happens when the mitral valve does not close properly. As a result, blood in the ventricle leaks back into the atrium. Mitral valve regurgitation causes the heart to work harder to pump blood. If the condition is mild, a person may not have symptoms. However, over time, this can lead to heart failure.  What are the causes?  This condition may be caused by:  · A condition in which the mitral valves do not close completely when the heart pumps blood (mitral valve prolapse).  · Infection, such as endocarditis or rheumatic fever.  · Damage to the mitral valve, such as from injury (trauma) to the heart, a problem present at birth (birth defect), or a heart attack.  · Certain medicines.    What increases the risk?  This condition is more likely to develop in people who have:  · Certain forms of heart disease.  · A family history of heart valve disease.  · Certain conditions that are present at birth (congenital).    You are also more likely to develop this condition if you have taken certain diet pills in the past.  What are the signs or symptoms?  Symptoms of this condition include:  · Shortness of breath with physical activity, like climbing stairs.  · Fast or irregular heartbeat.  · Cough.  · Suddenly waking up at night with difficulty breathing or needing to urinate.  · Heavy breathing.  · Extreme tiredness.  · Swelling in the lower legs, ankles, and feet.    In some cases of mild to moderate mitral regurgitation, there are no symptoms.  How is this diagnosed?  This  condition may be diagnosed based on the results of a physical exam. Your health care provider will listen to your heart for an abnormal heart sound (murmur). You may also have other tests, including:  · An echocardiogram. This test creates ultrasound images of the heart that allow your health care provider to see how the heart valves work while your heart is beating.  · Chest X-ray.  · Electrocardiogram (ECG). This is a test that records the electrical impulses of the heart.  · Cardiac catheterization. This test is used to look at the structure and function of the heart. A thin tube (catheter) is passed through the blood vessels and into the heart. Dye is injected into the blood vessels so the cardiac system can be seen on images that are taken.    How is this treated?  This condition may be treated with:  · Medicines. These may be given to treat symptoms and prevent complications.  · Surgery to repair or replace the mitral valve in severe, long-term (chronic) cases.    Follow these instructions at home:  Lifestyle  · Limit alcohol intake to no more than 1 drink a day for nonpregnant women and 2 drinks a day for men. One drink equals 12 oz of beer, 5 oz of wine, or 1½ oz of hard liquor.  · Do not use any products that contain nicotine or tobacco, such as cigarettes and e-cigarettes. If you need help quitting, ask your health care provider.  · Eat a heart-healthy diet that includes plenty of fresh fruits and vegetables, whole grains, low-fat (lean) protein, and low-fat dairy products. Consider working with a diet and nutrition specialist (dietitian) to help you make healthy food choices.  · Limit the amount of salt (sodium) in your diet. Avoid adding salt to foods, and avoid foods that are high in salt, such as:  ? Pickles.  ? Smoked and cured meats.  ? Processed foods.  · Maintain a healthy weight and stay physically active. Ask your health care provider to recommend activities that are safe for you.  · Try to get 7  or more hours of sleep each night.  · Find ways to manage stress. If you need help with this, ask your health care provider.  General instructions    · Take over-the-counter and prescription medicines only as told by your health care provider.  · Work closely with your health care provider to manage any other health conditions you have, such as diabetes or high blood pressure.  · If you plan to become pregnant, talk with your health care provider first.  · Keep all follow-up visits as told by your health care provider. This is important.  Contact a health care provider if:  · You have a fever.  · You feel more tired than usual when doing physical activity.  · You have a dry cough.  Get help right away if:  · You have shortness of breath.  · You develop chest pain.  · You have swelling in your hands, feet, ankles, or abdomen that is getting worse.  · You have trouble staying awake or you faint.  · You feel dizzy or unsteady.  · You suddenly gain weight.  · You feel confused.  · Any of your symptoms begin to get worse.  These symptoms may represent a serious problem that is an emergency. Do not wait to see if the symptoms will go away. Get medical help right away. Call your local emergency services (911 in the [place] Do not drive yourself to the hospital.  Summary  · Mitral valve regurgitation, also called mitral regurgitation, is a condition in which blood leaks from a valve between two chambers of the heart (mitral valve).  · Depending on how severe your condition is, you may be treated with medicines or surgery.  · Practice heart-healthy habits to manage this condition. These include limiting alcohol, avoiding nicotine and tobacco, and eating a balanced diet that is low in salt (sodium).  This information is not intended to replace advice given to you by your health care provider. Make sure you discuss any questions you have with your health care provider.  Document Released: 03/07/2006 Document Revised: 09/29/2017  Document Reviewed: 09/29/2017  [employer] Patient Education © 2018 [employer]